# Patient Record
Sex: MALE | Race: WHITE | Employment: FULL TIME | URBAN - METROPOLITAN AREA
[De-identification: names, ages, dates, MRNs, and addresses within clinical notes are randomized per-mention and may not be internally consistent; named-entity substitution may affect disease eponyms.]

---

## 2023-10-26 PROBLEM — M25.362 INSTABILITY OF LEFT KNEE JOINT: Status: ACTIVE | Noted: 2023-10-26

## 2023-10-26 PROBLEM — M71.22 BAKER CYST, LEFT: Status: ACTIVE | Noted: 2023-10-26

## 2023-10-26 PROBLEM — M17.12 PRIMARY OSTEOARTHRITIS OF LEFT KNEE: Status: ACTIVE | Noted: 2023-10-26

## 2023-10-26 PROBLEM — M25.569 KNEE PAIN: Status: ACTIVE | Noted: 2023-10-26

## 2023-10-26 RX ORDER — HYALURONATE SODIUM 20 MG/2 ML
1 SYRINGE (ML) INTRAARTICULAR
COMMUNITY
Start: 2022-03-31 | End: 2024-04-22 | Stop reason: HOSPADM

## 2023-10-27 ENCOUNTER — APPOINTMENT (OUTPATIENT)
Dept: ORTHOPEDIC SURGERY | Facility: CLINIC | Age: 53
End: 2023-10-27
Payer: COMMERCIAL

## 2023-11-17 ENCOUNTER — APPOINTMENT (OUTPATIENT)
Dept: ORTHOPEDIC SURGERY | Facility: CLINIC | Age: 53
End: 2023-11-17

## 2024-01-24 ENCOUNTER — OFFICE VISIT (OUTPATIENT)
Dept: ORTHOPEDIC SURGERY | Facility: HOSPITAL | Age: 54
End: 2024-01-24
Payer: COMMERCIAL

## 2024-01-24 VITALS — WEIGHT: 195 LBS | HEIGHT: 72 IN | BODY MASS INDEX: 26.41 KG/M2

## 2024-01-24 DIAGNOSIS — M71.22 BAKER CYST, LEFT: ICD-10-CM

## 2024-01-24 DIAGNOSIS — M17.12 PRIMARY OSTEOARTHRITIS OF LEFT KNEE: Primary | ICD-10-CM

## 2024-01-24 PROCEDURE — 2500000004 HC RX 250 GENERAL PHARMACY W/ HCPCS (ALT 636 FOR OP/ED): Performed by: FAMILY MEDICINE

## 2024-01-24 PROCEDURE — 2500000005 HC RX 250 GENERAL PHARMACY W/O HCPCS: Performed by: FAMILY MEDICINE

## 2024-01-24 PROCEDURE — 99214 OFFICE O/P EST MOD 30 MIN: CPT | Performed by: FAMILY MEDICINE

## 2024-01-24 PROCEDURE — 1036F TOBACCO NON-USER: CPT | Performed by: FAMILY MEDICINE

## 2024-01-24 PROCEDURE — 20611 DRAIN/INJ JOINT/BURSA W/US: CPT | Performed by: FAMILY MEDICINE

## 2024-01-24 RX ORDER — LIDOCAINE HYDROCHLORIDE 10 MG/ML
4 INJECTION INFILTRATION; PERINEURAL
Status: COMPLETED | OUTPATIENT
Start: 2024-01-24 | End: 2024-01-24

## 2024-01-24 RX ORDER — METHYLPREDNISOLONE ACETATE 40 MG/ML
80 INJECTION, SUSPENSION INTRA-ARTICULAR; INTRALESIONAL; INTRAMUSCULAR; SOFT TISSUE
Status: COMPLETED | OUTPATIENT
Start: 2024-01-24 | End: 2024-01-24

## 2024-01-24 RX ADMIN — LIDOCAINE HYDROCHLORIDE 4 ML: 10 INJECTION, SOLUTION INFILTRATION; PERINEURAL at 11:33

## 2024-01-24 RX ADMIN — METHYLPREDNISOLONE ACETATE 80 MG: 40 INJECTION, SUSPENSION INTRA-ARTICULAR; INTRALESIONAL; INTRAMUSCULAR; INTRASYNOVIAL; SOFT TISSUE at 11:33

## 2024-01-24 ASSESSMENT — PAIN - FUNCTIONAL ASSESSMENT: PAIN_FUNCTIONAL_ASSESSMENT: 0-10

## 2024-01-24 ASSESSMENT — PAIN SCALES - GENERAL: PAINLEVEL_OUTOF10: 6

## 2024-01-24 ASSESSMENT — PAIN DESCRIPTION - DESCRIPTORS: DESCRIPTORS: SHARP

## 2024-01-24 NOTE — PROGRESS NOTES
FUV: Left Knee Patient ID: Amarjit Hernandez is a 53 y.o. male.    L Inj/Asp: L knee on 1/24/2024 11:33 AM  Indications: pain  Details: 21 G needle, ultrasound-guided superolateral approach  Medications: 80 mg methylPREDNISolone acetate 40 mg/mL; 4 mL lidocaine 10 mg/mL (1 %)  Outcome: tolerated well, no immediate complications  Procedure, treatment alternatives, risks and benefits explained, specific risks discussed. Consent was given by the patient. Immediately prior to procedure a time out was called to verify the correct patient, procedure, equipment, support staff and site/side marked as required. Patient was prepped and draped in the usual sterile fashion.       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     On 10/01/21, he presented for evaluation of chronic left knee pain upon referral by Dr. Friedman. He states he has had his current pain for 2 years. It is worse with long walking along the medial aspect and pain with stairs and flexion along the anterior. He denies inciting event or trauma. He states it was with insidious onset. It is most significant with workouts where he performs [employer] and jumping. He does not currently use an assistive device, brace however he does use a compression sleeve. He does not use topical or oral medication for his pain. He has history of arthroscopic procedure in 2019 for the left knee. This was for a arthroscopic debridement. He has received multiple cortisone injections in the past most recent being June 2021 which provided 3 months of 100% relief which is typical for him. Today is requesting cortisone injection into the left knee and discussion of other possible therapeutic options. He has no other complaints today. We agreed to proceed with a corticosteroid injection into the left knee. He tolerated  the procedure well without immediate complication. we discussed that in the future he would be a candidate for both hyaluronic acid injections and possibly with for PRP. We discussed both the hyaluronic acid and PRP extensively for consideration in the future. Additionally it was recommended that he proceed with acetaminophen as needed for his left knee pain. All of his questions were answered today. He will return to clinic as needed for continued injections in the future.     On 12/30/2021 he returns today for follow-up of his left knee pain. He indicates that since his left knee corticosteroid injection he has had 100% relief in his pain with intermittent sharp flares with some maneuvers otherwise no pain. Today he wants to discuss his persistent Baker's cyst which is located along the posterior aspect of his knee. Since his prior visit he has felt that it has grown in size and has become increasingly uncomfortable with deep flexion and he is notices stiffness due to the size of the cyst. He has no history of drainage and cyst in the past and no previous Baker's cyst. He has had no new traumas or falls. He would also like to consider repeat corticosteroid injection for his left knee as well for continued relief in his symptoms. We agreed to a diagnostic and hopefully therapeutic cortisone injection into the left knee as well as a therapeutic Baker's cyst aspiration. This was tolerated well. Activity modifications were reviewed. We discussed proceeding with insurance authorization for hyaluronic acid injections. After authorization he will follow-up in clinic for initiation of the HA series.      On 3/29/2022, he returns for 3-month follow-up of chronic left knee pain and a swollen Baker's cyst. He is requesting cyst aspiration today. He had great relief for 2 months and then it started to return over the past 1 month. He does not have much anterior knee pain. This is painful and limiting his daily and  recreational activities. He denies interval injury or trauma. We agreed to aspiration of the Baker's cyst in the popliteal fossa and then a cortisone injection into the knee joint. He tolerated this well. Activity modifications were reviewed. We again reviewed my recommendations of pursuing insurance authorization for hyaluronic acid injections. We will try that and then possibly PRP in the future if needed.      On 5/31/2022, he returns for 2-month follow-up of his chronic left knee DJD and symptomatic Baker's cyst. He has had increased swelling for several weeks now and is going to [place] on a family trip. He was hoping to get the cyst drained before he leaves. Were planning on doing PRP later in June. He denies interval injury or trauma.   We agreed to aspiration of the Baker's cyst in the popliteal fossa. He tolerated this well. Activity modifications were reviewed. We we will plan on PRP in the next 6 to 8 weeks. He will follow-up sooner if needed.     On 10/18/2022, he returns for a 4.5-month follow-up of chronic left knee DJD and symptomatic Baker's cyst. He last had a cortisone injection on 3/29/2022 and we were planning on PRP this summer but he was too busy with family and work activities and travel. The Baker's cyst has become very swollen, tight and painful. He is wanting aspiration. He would also like a cortisone injection to help with pain and to try to slow down the accumulation of a recurrent Baker's cyst. He is interested in pursuing PRP this fall now that he is not as active. He denies interval injury or trauma.  We agreed to aspiration of the Baker's cyst in the popliteal fossa and a cortisone injection in the superior lateral, anterior joint of the left knee. He tolerated this well. Activity modifications were reviewed. We we will plan on PRP in the next 6 to 8 weeks. He will follow-up sooner if needed.     On 1/24/2023, he returns for 3-month follow-up of chronic left knee DJD and a  symptomatic Baker's cyst. He wants to try PRP today. He understands this is an out-of-pocket expense and not covered by insurance. He reports persistent pain at the knee as he described before and the previous cortisone injection has worn off. He feels swelling behind the knee but not as bad as last visit. He would like to proceed with aspiration and PRP if possible. He denies interval injury or trauma. He has no contraindications to PRP. He has no other new acute complaints today.  We agreed to aspiration of the Baker's cyst in the popliteal fossa and start PRP treatment today. We were successful obtaining blood specimen out of the right antecubital fossa. PRP was injected into the left knee without any complications. We reviewed the post procedure protocol including nonweightbearing, crutch assisted ambulation for 2 days and then progressing off the crutches as tolerated. He will follow-up in 8 weeks or sooner for recheck.     On 6/20/2023, he returns for 5-month follow-up of chronic left knee pain and a symptomatic Baker's cyst status post PRP trial on 1/24/2023. He reports only 2 to 3 months of symptom relief with the PRP and then his pain returned. He first noticed nighttime pain and then this progressed to pain during the day while at rest. He continues to be active and this is limiting his ADLs and recreational activities. He denies interval injury or trauma. The swelling behind the knee returned in March and has worsened. It is now painful and symptomatic as before. He is hoping to get knee aspiration and cortisone injection which helped him in the past. He is currently not using an orthopedic knee brace but has used 1 over 5 years ago. It is worn out. He would consider a new one. He has no problems with the right knee.  We agreed to aspiration of the Baker's cyst in the popliteal fossa and repeat a cortisone injection into the left knee joint. He tolerated this well. We agreed that he did not get much  benefit from PRP. He may want to consider viscosupplementation in the near future. He can contact my  to set this up. We did review orthopedic bracing and due to his near bone-on-bone medial compartment DJD, he would likely benefit from an  knee brace. He has used one in the past and needs a new one. We agreed to try the OA Lety medial  brace and he was measured today. He will follow-up when his symptoms return.     On 9/12/2023, he returns for 3-month follow-up of chronic left knee pain and swelling and a Baker's cyst. He is requesting repeat aspiration of the Baker's cyst and cortisone injection. He has an upcoming trip and would like better mobility. He never got his orthopedic knee brace after he was measured at the last visit. He is interested in knee surgery including partial versus total replacement. He denies interval injury or trauma.  We agreed to aspiration of the Baker's cyst in the popliteal fossa and repeat a cortisone injection into the left knee joint. He tolerated this well. We agreed that he has maximized my attempts at conservative management with PRP, cortisone injections and other nonsurgical attempts. He is interested in orthopedic bracing and due to his near bone-on-bone medial compartment DJD, he would likely benefit from an  knee brace. He is interested in partial versus total knee replacement and we will set him up with Dr. Vaughn. He will follow-up when his symptoms return.     Today, 1/24/2024, he returns for 4-month follow-up of chronic left knee pain and swelling, especially at a Baker's cyst.  He would like knee aspiration and cortisone injection if possible before he undergoes knee replacement on 4/22/2024 with Dr. Vaughn.  He is having quite a bit of pain and stiffness from the Baker's cyst.  He denies interval injury or trauma.  He is still trying to be very active.    He has no other complaints.    Physical Examination:     The left knee has  positive laxity with valgus stress test at 0 degrees but no pain. The left knee is without obvious signs of acute bony deformity, swelling, erythema, ecchymosis. No anterior joint effusion. Large, minimally tender baker's cyst palpated in the popliteal fossa. The patella is without tenderness. Apprehension is negative with medial and lateral glide. Patella crepitus is positive. Patella grind is negative. The medial joint line is tender and without bony crepitus or step-off. The lateral joint line is nontender and without bony crepitus or step-off. Flexion & extension are full and symmetrical. There is no instability with stress testing. The opposite knee is nontender and stable. Gait is pain-free and tandem.     Imaging:  === 09/12/23 ===  XR KNEE COMPLETE 4 OR MORE VIEWS  - Impression -  Moderate tricompartmental osteoarthrosis, more pronounced in the  medial femorotibial compartment.      Procedure:  Procedure #1: After consent was obtained, the Posterior Left knee was prepped in a sterile fashion. Ultrasound guidance was used to help insure proper needle placement into the Baker's cyst, decrease patient discomfort, and decrease collateral damage. The cyst was aspirated of 71 mL of clear, straw-colored joint fluid without any complications. Ultrasound images were saved on an internal file for later reference. The patient tolerated the procedure well and the area was cleaned and bandaged.        Procedure #2: After consent was obtained, the LEFT knee was prepped in a sterile fashion. Ultrasound guidance was used to help insure proper needle placement into the knee joint, decrease patient discomfort, and decrease collateral damage. The joint was visualized and Depo-Medrol 80 mg with lidocaine 4 mL & ropivacaine 4 mL were injected without any complications. Ultrasound images were saved on an internal file for later reference. The patient tolerated the procedure well and the area was cleaned and bandaged.        Problem List Items Addressed This Visit             ICD-10-CM    Baker cyst, left M71.22    Relevant Orders    Point of Care Ultrasound (Completed)    Primary osteoarthritis of left knee - Primary M17.12       Assessment and Plan:     We reviewed the exam and x-ray findings and discussed the conservative and surgical treatment options. We agreed to aspiration of the Baker's cyst in the popliteal fossa and repeat a cortisone injection into the left knee joint. He tolerated this well.  He will keep his scheduled joint replacement surgery with Dr. Vaughn.  I am happy to see him back as needed.    **This note was dictated using Dragon speech recognition software and was not corrected for spelling or grammatical errors**.    Artemio García MD  Sports Medicine Specialist  [facility] [place]

## 2024-02-18 ENCOUNTER — TRANSCRIBE ORDERS (OUTPATIENT)
Dept: OPERATING ROOM | Facility: HOSPITAL | Age: 54
End: 2024-02-18
Payer: COMMERCIAL

## 2024-02-18 DIAGNOSIS — M17.12 UNILATERAL PRIMARY OSTEOARTHRITIS, LEFT KNEE: Primary | ICD-10-CM

## 2024-02-19 PROBLEM — M17.12 UNILATERAL PRIMARY OSTEOARTHRITIS, LEFT KNEE: Status: ACTIVE | Noted: 2024-02-18

## 2024-02-29 ENCOUNTER — OFFICE VISIT (OUTPATIENT)
Dept: ORTHOPEDIC SURGERY | Facility: CLINIC | Age: 54
End: 2024-02-29
Payer: COMMERCIAL

## 2024-02-29 VITALS — HEIGHT: 72 IN | BODY MASS INDEX: 26.41 KG/M2 | WEIGHT: 195 LBS

## 2024-02-29 DIAGNOSIS — M17.12 PRIMARY OSTEOARTHRITIS OF LEFT KNEE: Primary | ICD-10-CM

## 2024-02-29 PROCEDURE — 99214 OFFICE O/P EST MOD 30 MIN: CPT | Performed by: ORTHOPAEDIC SURGERY

## 2024-02-29 PROCEDURE — 1036F TOBACCO NON-USER: CPT | Performed by: ORTHOPAEDIC SURGERY

## 2024-02-29 ASSESSMENT — PAIN SCALES - GENERAL: PAINLEVEL_OUTOF10: 0 - NO PAIN

## 2024-02-29 ASSESSMENT — PAIN DESCRIPTION - DESCRIPTORS: DESCRIPTORS: DISCOMFORT

## 2024-02-29 ASSESSMENT — PAIN - FUNCTIONAL ASSESSMENT: PAIN_FUNCTIONAL_ASSESSMENT: 0-10

## 2024-02-29 NOTE — PROGRESS NOTES
Patient is a 53-year-old gentleman who presents today for discussion of upcoming left total knee arthroplasty.  He is failed a greater than 2-month trial of reasonable conservative treatment occluding anti-inflammatories, cortisone injections and bracing.  He performs home exercise program.  He has a remote history of arthroscopy approximately 8 years ago.  He rates his pain at times as 8 out of 10.  He would like to proceed with total knee arthroplasty which is indicated at this time.    Left knee:  AAOx3, NAD, walks with a moderate antalgic gait  Varus allignment  Range of motion lacks 10 degrees of full extension and flexes to 110 degrees  Stable to varus/valgus/anterior/posterior stress through out the range of motion  Slight laxity with varus stress  Diffuse medial  joint line tenderness to palpation  Moderate effusion  SILT in a antonietta/saph/per/tib distribution  5/5 knee extension/df/pf/ehl  ½ dorsalis pedis and posterior tibial pulse  no popliteal lymphadenopathy  no other overlying lesions  mood: euthymic  Respirations non labored    Plain films were reviewed by myself in clinic today.  They have advanced osteoarthritis of their knee with significant joint space narrowing, bone on bone changes, underlying sclerosis and tricompartmental osteophytic change.    Patient is now failed a greater than 3-month trial of reasonable conservative treatment and wishes proceed with surgery which is indicated at this time.  Discussed the risk benefits alternatives to surgery.  All of their questions were answered.    Pro[place]: left total knee  [place] [facility] [place]: M17.12  CPT: 93294  Stay: outpatient  Equipment: [facility]    I talked with the patient at length about risks, limitations, benefits and alternatives to total knee replacement today. I reviewed concerns about implant wear, loosening, breakage, infection and infection prophylaxis, DVT, PE, death and other medical and anesthetic complications of surgery.  We talked about the potential for persistent pain following surgery since there are many possible causes for knee and leg pain. The patient was advised that knee replacement will only relieve pain that is coming from the knee. We talked about limited range of motion following knee replacement and the importance of physical therapy and their motivation. We talked about improvements in pain management post-operatively and our accelerated rehab program. We talked about wound healing issues and neurovascular complications of surgery. I reviewed functional and activity restrictions in detail. We discussed the possible need for a homologous blood transfusion. We discussed the fact that many of our patients are able to go home in 1 day or less depending on their health, mobility, pre-op preparation, individual home situation and personal preference.  The patient has identified their personal goals of their joint replacement surgery and recovery and we have discussed them. These are documented in our [employer] patient engagement platform. In addition, we have discussed the advantages and disadvantages of various implant and fixation options, as well as various surgical approaches.  The basic concepts of the joint replacement procedure has been reviewed with the patient and the patient has been provided the opportunity to see an actual implant either in the office or in our pre-op education class.  All of the patients questions were answered. The patient can call my office to schedule surgery and the pre-op teaching class. I told the patient that they should contact their primary care physician to discuss fitness for surgery.    This note was created using voice recognition software and was not corrected for typographical or grammatical errors.

## 2024-03-14 NOTE — PROGRESS NOTES
Thank you for attending our Joint Replacement class today in preparation for your upcoming surgery.  Topics discussed include:    MyChart Enrollment  Communication with Care Team  My Chart is the best form of communication to reach all of your caregivers  You can send messages to specific care givers, or a care team  Continued Education  You will be enrolled in a Total Joint Replacement care plan to receive additional education before and after surgery  You can review a short recording of the class content  Access to Medical Records  You can access test results, office notes, appointments, etc.  You can connect to other healthcare systems who use [employer] ([facility], [facility], [facility], etc.)  [employer]  Program Information  Consent to Enroll    Background/Understanding of Joint Replacement Surgery  Potential for same day discharge  Any questions or concerns to be directed to the surgeon's office    How to Prepare for Surgery  Use of Nicotine Products/Smoking  Stop several weeks before surgery  Such products slow down the healing process and increase risk of post-op infection and complications  Clearance for Surgery  Medical Clearance by Specialists  Dental Clearance  Cracked/Broken/Loose teeth left untreated may postpone surgery  The importance of post-op antibiotics for dental visits per surgeon protocol  Preadmission Testing  **Potential for postponed surgery if appropriate clearance is not obtained  Medication Instruction  Follow instructions provided by the doctor who prescribes your medication (typically, but not limited to cardiologist)  Preadmission testing will provide additional instructions during your appointment on what to stop and what to take as you get closer to surgery  For clarification of these instructions, please call preadmission testing directly - [phone]  Tips for Preparing the home for discharge from the hospital  Care Partner  Requirement for surgery, the patient must have a plan to have  help at home  Potential for postponed surgery if plan for home support cannot be established  How the care partner can help after surgery  CHG Body Wash/Mouth Wash  Follow the instructions given at preadmission testing  Body wash is to be used on the body and hair for 5 washes  Mouthwash is to be used the night before and morning of surgery  **This is a system-wide protocol developed by infectious disease professionals, we will not alter our recommendations for those with sensitive skin or those who have special hair needs.  Please follow the instructions as they are written as this will provide the best infection prevention measures for surgery.  Should you have an allergy to one of the products, please discuss with your preadmission team**    What to Expect in the Hospital/At Home  Morning of Surgery NPO Guidelines  Nothing to eat after midnight  Water can be consumed up to 2 hours prior to arrival  Surgical and Post-Surgical Care Team  Surgical Team  Anesthesia Team  Nursing  Physical Therapy  Care Coordinating  Pharmacy  Hospital Arrival Instructions  Arrive at the time provided to you  Consider traffic patterns (rush-hour) based on arrival time  Have arrangements made for a ride home  If discharging same day, care partner should remain at the hospital  Recovering after Surgery  Recovery Room - Visitors are not brought back  Transition to hospital room - 2nd Floor, Visitors will be directed to your room  The presence of and strategies for controlling surgical pain and swelling  The importance of early mobility  Side effects after surgery  What to expect if staying overnight    Discharge Planning  The intended plan for discharge will be for patients to discharge home  All patients require a care partner (family, friend, neighbor, etc.) to stay with the patient for the first few nights after surgery  The inability to secure help at home will postpone surgery  Home Care Services set up per surgeon order  Physical  Therapy  Occupational Therapy  **If desired, private duty care can be arranged by the patient ahead of time**  Outpatient Physical Therapy per surgeon order    Recovering at Home  Wound Care  Follow wound care instructions found in your discharge paperwork  Bandage is water-resistant and you may shower with the bandage  Do not scrub directly over the bandage  Do not submerge in water until cleared (bathtub, hot tub, pool, etc.)    Post-Op Risk Prevention  Infection Prevention  Promptly seek treatment for any infections post-operatively  Routine dental visits must be postponed for 3 months after surgery  Your surgeon may require antibiotics prior to future dental visits  Any concerns for infection not related directly to the knee or the hip should be managed by your primary care provider  Blood Clots  Be sure to complete the course of blood thinning medication as prescribed by your surgeon  Movement every 1-2 hours during the day is encouraged to prevent blood clots  Monitor for signs of blood clots  Wear compression stockings as prescribed by your surgeon  Constipation  Constipation is common following surgery  Drink plenty of fluids  Take stool softener/laxative as prescribed by your surgeon  Move around frequently  Eat foods high in fiber  Fall Prevention  Prepare home ahead of time to clear space to move with walker  Remove throw rugs and electrical cords from walkways  Install railings near any stairways with more than 2 steps  Use night lights for increased visibility at night  Continue to use your assistive device until cleared by surgeon or physical therapy  Dislocation Prevention - Not all procedures will have dislocation precautions  Follow dislocation precautions provided by your surgeon  It is OK to resume sexual activity about 6 weeks following surgery  Be sure to follow any dislocation precautions assigned    Durable Medical Equipment  Cold Therapy  Breg Cold Therapy Machines  Ice/Gel Packs  Assistive  Devices  Folding Walker with Wheels (in the front only)  No Rollators  Crutches if approved by Physical Therapy and Surgeon after surgery  Hip Kits  Raised Toilet Seats  Additional Compression Stockings    Joint Preservation  Healthy Activities when Cleared  Walking  Swimming  Bike Riding  Activities to Avoid  Refrain from repetitive motions which have a high impact on the joint  Gradual Progression  Progress activity slowly, listen to your body  Common Findings - NORMAL after surgery  Clicking/Grinding  Numbness near incision    Physical Therapy  Prehabilitation exercises  START TODAY ON BOTH LEGS  Surgery Specific Precautions  Follow surgery specific precautions found in your discharge paperwork    Follow-Up Visit  All patients will see their surgeon for a follow up visit after surgery  The visit may range from 2-6 weeks after surgery and is surgeon specific      Please don't hesitate to reach out if you have any additional questions or concerns.    Sarita Thao MBA, BSN, RN-BC  MALISSA SerratoN, RN  Orthopedic Program Navigators  [facility]   [phone]

## 2024-03-20 ENCOUNTER — EDUCATION (OUTPATIENT)
Dept: ORTHOPEDIC SURGERY | Facility: HOSPITAL | Age: 54
End: 2024-03-20
Payer: COMMERCIAL

## 2024-03-20 ASSESSMENT — KOOS JR
STRAIGHTENING KNEE FULLY: MODERATE
KOOS JR SCORING: 68.28
HOW SEVERE IS YOUR KNEE STIFFNESS AFTER FIRST WAKING IN MORNING: MODERATE
RISING FROM SITTING: MILD
TWISING OR PIVOTING ON KNEE: MODERATE

## 2024-04-04 ENCOUNTER — TELEMEDICINE CLINICAL SUPPORT (OUTPATIENT)
Dept: PREADMISSION TESTING | Facility: HOSPITAL | Age: 54
End: 2024-04-04
Payer: COMMERCIAL

## 2024-04-04 VITALS — BODY MASS INDEX: 26.41 KG/M2 | WEIGHT: 195 LBS | HEIGHT: 72 IN

## 2024-04-10 ENCOUNTER — APPOINTMENT (OUTPATIENT)
Dept: PREADMISSION TESTING | Facility: HOSPITAL | Age: 54
End: 2024-04-10
Payer: COMMERCIAL

## 2024-04-12 ENCOUNTER — TELEPHONE (OUTPATIENT)
Dept: ORTHOPEDIC SURGERY | Facility: HOSPITAL | Age: 54
End: 2024-04-12
Payer: COMMERCIAL

## 2024-04-12 ASSESSMENT — KOOS JR
HOW SEVERE IS YOUR KNEE STIFFNESS AFTER FIRST WAKING IN MORNING: MODERATE
RISING FROM SITTING: MILD
TWISING OR PIVOTING ON KNEE: MODERATE
STRAIGHTENING KNEE FULLY: MODERATE
KOOS JR SCORING: 68.28

## 2024-04-12 NOTE — TELEPHONE ENCOUNTER
I have transcribed the survey answers you provided during joint class into the electronic record. Thank you for completing them. This will help us to track your progress from preop to postop.    Thank you,    Geraldine Romero MBA, BSN, RN-BC  Orthopedic   [facility]  [phone]

## 2024-04-15 ENCOUNTER — TELEPHONE (OUTPATIENT)
Dept: ORTHOPEDIC SURGERY | Facility: HOSPITAL | Age: 54
End: 2024-04-15
Payer: COMMERCIAL

## 2024-04-15 DIAGNOSIS — Z47.1 AFTERCARE FOLLOWING LEFT KNEE JOINT REPLACEMENT SURGERY: ICD-10-CM

## 2024-04-15 DIAGNOSIS — Z96.652 AFTERCARE FOLLOWING LEFT KNEE JOINT REPLACEMENT SURGERY: ICD-10-CM

## 2024-04-15 NOTE — TELEPHONE ENCOUNTER
Thank you for taking my call today.  All questions were answered at the time of the call, but please feel free to reach out to me via [employer] or phone, [phone], with any new questions or concerns.       We confirmed that you opted to enroll in our [employer] program so your discharge prescriptions will be available to take home at the time of discharge.  Please bring any prescription insurance coverage with you on the morning of surgery so that we can enter the information into our system.     We confirmed that your plan would be to Discharge Home same day ([facility]).    We confirmed that you have DME needed for recovery.     Use the provided body wash for 4 days before surgery and complete a 5th shower on the morning of surgery, this includes your body and hair.  Follow the directions as provided during preadmission testing.  The mouth wash will be used the night before and the morning of surgery.       As a reminder, if you do not hear from our team, please call [phone] between 2pm and 3pm the business day before your surgery to confirm your arrival time and details.        Please don't hesitate to reach out with additional questions or concerns.    Sarita Thao MBA, BSN, RN-BC  MALISSA SerratoN, RN  [facility]  [facility]  [phone]

## 2024-04-17 ENCOUNTER — LAB (OUTPATIENT)
Dept: LAB | Facility: LAB | Age: 54
End: 2024-04-17
Payer: COMMERCIAL

## 2024-04-17 ENCOUNTER — HOSPITAL ENCOUNTER (OUTPATIENT)
Dept: RADIOLOGY | Facility: HOSPITAL | Age: 54
Discharge: HOME | End: 2024-04-17
Payer: COMMERCIAL

## 2024-04-17 ENCOUNTER — PRE-ADMISSION TESTING (OUTPATIENT)
Dept: PREADMISSION TESTING | Facility: HOSPITAL | Age: 54
End: 2024-04-17
Payer: COMMERCIAL

## 2024-04-17 VITALS
DIASTOLIC BLOOD PRESSURE: 85 MMHG | BODY MASS INDEX: 27.67 KG/M2 | HEART RATE: 71 BPM | WEIGHT: 204.26 LBS | SYSTOLIC BLOOD PRESSURE: 122 MMHG | HEIGHT: 72 IN | OXYGEN SATURATION: 96 % | TEMPERATURE: 97.9 F | RESPIRATION RATE: 14 BRPM

## 2024-04-17 DIAGNOSIS — Z01.818 PREOPERATIVE TESTING: Primary | ICD-10-CM

## 2024-04-17 DIAGNOSIS — M17.12 UNILATERAL PRIMARY OSTEOARTHRITIS, LEFT KNEE: ICD-10-CM

## 2024-04-17 DIAGNOSIS — Z01.818 PREOPERATIVE TESTING: ICD-10-CM

## 2024-04-17 LAB
ANION GAP SERPL CALC-SCNC: 13 MMOL/L (ref 10–20)
BASOPHILS # BLD AUTO: 0.06 X10*3/UL (ref 0–0.1)
BASOPHILS NFR BLD AUTO: 0.7 %
BUN SERPL-MCNC: 19 MG/DL (ref 6–23)
CALCIUM SERPL-MCNC: 9.8 MG/DL (ref 8.6–10.3)
CHLORIDE SERPL-SCNC: 102 MMOL/L (ref 98–107)
CO2 SERPL-SCNC: 28 MMOL/L (ref 21–32)
CREAT SERPL-MCNC: 0.91 MG/DL (ref 0.5–1.3)
EGFRCR SERPLBLD CKD-EPI 2021: >90 ML/MIN/1.73M*2
EOSINOPHIL # BLD AUTO: 0.25 X10*3/UL (ref 0–0.7)
EOSINOPHIL NFR BLD AUTO: 2.9 %
ERYTHROCYTE [DISTWIDTH] IN BLOOD BY AUTOMATED COUNT: 12.6 % (ref 11.5–14.5)
EST. AVERAGE GLUCOSE BLD GHB EST-MCNC: 103 MG/DL
GLUCOSE SERPL-MCNC: 93 MG/DL (ref 74–99)
HBA1C MFR BLD: 5.2 %
HCT VFR BLD AUTO: 43.3 % (ref 41–52)
HGB BLD-MCNC: 14.5 G/DL (ref 13.5–17.5)
IMM GRANULOCYTES # BLD AUTO: 0.01 X10*3/UL (ref 0–0.7)
IMM GRANULOCYTES NFR BLD AUTO: 0.1 % (ref 0–0.9)
LYMPHOCYTES # BLD AUTO: 3.14 X10*3/UL (ref 1.2–4.8)
LYMPHOCYTES NFR BLD AUTO: 36.6 %
MCH RBC QN AUTO: 29.7 PG (ref 26–34)
MCHC RBC AUTO-ENTMCNC: 33.5 G/DL (ref 32–36)
MCV RBC AUTO: 89 FL (ref 80–100)
MONOCYTES # BLD AUTO: 0.6 X10*3/UL (ref 0.1–1)
MONOCYTES NFR BLD AUTO: 7 %
NEUTROPHILS # BLD AUTO: 4.52 X10*3/UL (ref 1.2–7.7)
NEUTROPHILS NFR BLD AUTO: 52.7 %
NRBC BLD-RTO: NORMAL /100{WBCS}
PLATELET # BLD AUTO: 298 X10*3/UL (ref 150–450)
POTASSIUM SERPL-SCNC: 3.8 MMOL/L (ref 3.5–5.3)
RBC # BLD AUTO: 4.88 X10*6/UL (ref 4.5–5.9)
SODIUM SERPL-SCNC: 139 MMOL/L (ref 136–145)
WBC # BLD AUTO: 8.6 X10*3/UL (ref 4.4–11.3)

## 2024-04-17 PROCEDURE — 93005 ELECTROCARDIOGRAM TRACING: CPT

## 2024-04-17 PROCEDURE — 87081 CULTURE SCREEN ONLY: CPT

## 2024-04-17 PROCEDURE — 77073 BONE LENGTH STUDIES: CPT | Performed by: RADIOLOGY

## 2024-04-17 PROCEDURE — 73562 X-RAY EXAM OF KNEE 3: CPT | Performed by: RADIOLOGY

## 2024-04-17 PROCEDURE — 83036 HEMOGLOBIN GLYCOSYLATED A1C: CPT

## 2024-04-17 PROCEDURE — 80048 BASIC METABOLIC PNL TOTAL CA: CPT

## 2024-04-17 PROCEDURE — 77073 BONE LENGTH STUDIES: CPT

## 2024-04-17 PROCEDURE — 99204 OFFICE O/P NEW MOD 45 MIN: CPT | Performed by: NURSE PRACTITIONER

## 2024-04-17 PROCEDURE — 85025 COMPLETE CBC W/AUTO DIFF WBC: CPT

## 2024-04-17 PROCEDURE — 73562 X-RAY EXAM OF KNEE 3: CPT | Mod: LT

## 2024-04-17 PROCEDURE — 36415 COLL VENOUS BLD VENIPUNCTURE: CPT

## 2024-04-17 RX ORDER — CHLORHEXIDINE GLUCONATE ORAL RINSE 1.2 MG/ML
15 SOLUTION DENTAL AS NEEDED
Qty: 30 ML | Refills: 0 | Status: SHIPPED | OUTPATIENT
Start: 2024-04-17 | End: 2024-04-22 | Stop reason: HOSPADM

## 2024-04-17 ASSESSMENT — ENCOUNTER SYMPTOMS
PSYCHIATRIC NEGATIVE: 1
GASTROINTESTINAL NEGATIVE: 1
RESPIRATORY NEGATIVE: 1
ENDOCRINE NEGATIVE: 1
CARDIOVASCULAR NEGATIVE: 1
HEMATOLOGIC/LYMPHATIC NEGATIVE: 1
CONSTITUTIONAL NEGATIVE: 1
EYES NEGATIVE: 1
ALLERGIC/IMMUNOLOGIC NEGATIVE: 1
NEUROLOGICAL NEGATIVE: 1

## 2024-04-17 ASSESSMENT — PAIN - FUNCTIONAL ASSESSMENT: PAIN_FUNCTIONAL_ASSESSMENT: 0-10

## 2024-04-17 ASSESSMENT — PAIN SCALES - GENERAL: PAINLEVEL_OUTOF10: 0 - NO PAIN

## 2024-04-17 NOTE — PREPROCEDURE INSTRUCTIONS
Medication List            Accurate as of April 17, 2024  3:57 PM. Always use your most recent med list.                chlorhexidine 0.12 % solution  Commonly known as: Peridex  Use 15 mL in the mouth or throat if needed (pre operative 15ml swish and spit the night befor and morning of surgery) for up to 2 doses.  Notes to patient: As instructed     Euflexxa 10 mg/mL(mw 2.4 -3.6 million) injection  Generic drug: sodium hyaluronate  Notes to patient: Per prescriber              NPO Instructions:    Do not eat any food after midnight the night before your surgery/procedure.  You may have clear liquids until TWO hours before surgery/procedure. This includes water, black tea/coffee, (no milk or cream) apple juice and electrolyte drinks (Gatorade).  You may chew gum up to TWO hours before your surgery/procedure.    Additional Instructions:     Seven/Six Days before Surgery:  Review your medication instructions, stop indicated medications  Five Days before Surgery:  Review your medication instructions, stop indicated medications  Begin using your Hibiclens  Three Days before Surgery:  Review your medication instructions, stop indicated medications  The Day before Surgery:  Review your medication instructions, stop indicated medications  You will be contacted regarding the time of your arrival to facility and surgery time  Do not eat any food after Midnight  Day of Surgery:  Review your medication instructions, take indicated medications  You may have clear liquids until TWO hours before surgery/procedure.  This includes water, black tea/coffee, (no milk or cream) apple juice and electrolyte drinks (Gatorade)  You may chew gum up to TWO hours before your surgery/procedure  Wear  comfortable loose fitting clothing  Do not use moisturizers, creams, lotions or perfume  All jewelry and valuables should be left at home  CONTACT SURGEON'S OFFICE IF YOU DEVELOP:  * Fever = 100.4 F   * New respiratory symptoms (e.g. cough,  shortness of breath, respiratory distress, sore throat)  * Recent loss of taste or smell  *Flu like symptoms such as headache, fatigue or gastrointestinal symptoms  * You develop any open sores, shingles, burning or painful urination   AND/OR:  * You no longer wish to have the surgery.  * Any other personal circumstances change that may lead to the need to cancel or defer this surgery.  *You were admitted to any hospital within one week of your planned procedure.    SMOKING:  *Quitting smoking can make a huge difference to your health and recovery from surgery.    *If you need help with quitting, call 0-708-QUIT-NOW.    THE DAY BEFORE SURGERY:  *Do not eat any food after midnight the night before your surgery.   *You may have up to TEN OUNCES of clear liquids until TWO hours before your instructed ARRIVAL TIME to hospital. This includes water, black tea/coffee, (no milk or cream) apple juice, clear broth and electrolyte drinks (Gatorade). Please avoid clear liquids that are red in color.   *You may chew gum/mints up to TWO hours before your surgery/procedure.    SURGICAL TIME:  *You will be contacted between 2 p.m. and 3 p.m. the business day before your surgery with your arrival time.  *If you haven't received a call by 3pm, call [phone]  *Scheduled surgery times may change and you will be notified if this occurs-check your personal voicemail for any updates.    ON THE MORNING OF SURGERY:  *Wear comfortable, loose fitting clothing.   *Do not use moisturizers, creams, lotions or perfume.  *All jewelry and valuables should be left at home.  *Prosthetic devices such as contact lenses, hearing aids, dentures, eyelash extensions, hairpins and body piercing must be removed before surgery.    BRING WITH YOU:  *Photo ID and insurance card  *Current list of medications and allergies  *Pacemaker/Defibrillator/Heart stent cards  *CPAP machine and mask  *Slings/splints/crutches  *Copy of your complete Advanced  Directive/DHPOA-if applicable  *Neurostimulator implant remote    PARKING AND ARRIVAL:  *Check in at the Main Entrance desk and let them know you are here for surgery.    IF YOU ARE HAVING OUTPATIENT/SAME DAY SURGERY:  *A responsible adult MUST accompany you at the time of discharge and stay with you for 24 hours after your surgery.  *You may NOT drive yourself home after surgery.  *You may use a taxi or ride sharing service ([place], Uber) to return home ONLY if you are accompanied by a friend or family member.  *Instructions for resuming your medications will be provided by your surgeon.    Thank you for coming to Pre Admission testing.     If I have prescribe medication please don't forget to  at your pharmacy.     Any questions about today's visit call [phone] and leave a message in the general mailbox.    Patient instructed to ambulate as soon as possible postoperatively to decrease thromboembolic risk.    Thank you for visiting the [facility].  If you have any changes to your health condition, please call the surgeons office to alert them and give them details of your symptoms.    SAMIRA Stanley  Department of Anesthesiology and Perioperative Medicine      Preoperative Fasting Guidelines    Why must I stop eating and drinking near surgery time?  With sedation, food or liquid in your stomach can enter your lungs causing serious complications  Increases nausea and vomiting    When do I need to stop eating and drinking before my surgery?  Do not eat any food after midnight the night before your surgery/procedure.  You may have up to TEN ounces of clear liquid until TWO hours before your instructed arrival time to the hospital.  This includes water, black tea/coffee, (no milk or cream) apple juice, and electrolyte drinks (Gatorade)  You may chew gum until TWO hours before your surgery/procedure      Additional Instructions:     The Day before Surgery:  -Review your medication  instructions, stop indicated medications  -You will be contacted in the evening regarding the time of your arrival to facility and surgery time    Day of Surgery:  -Review your medication instructions, take indicated medications  -Wear comfortable loose fitting clothing  -Do not use moisturizers, creams, lotions or perfume  -All jewelry and valuables should be left at home              Preoperative Brain Exercises    What are brain exercises?  A brain exercise is any activity that engages your thinking (cognitive) skills.    What types of activities are considered brain exercises?  Jigsaw puzzles, crossword puzzles, word jumble, memory games, word search, and many more.  Many can be found free online or on your phone via a mobile jeison.    Why should I do brain exercises before my surgery?  More recent research has shown brain exercise before surgery can lower the risk of postoperative delirium (confusion) which can be especially important for older adults.  Patients who did brain exercises for 5 to 10 hours the days before surgery, cut their risk of postoperative delirium in half up to 1 week after surgery.                  [facility]    Preoperative Deep Breathing Exercises    Why it is important to do deep breathing exercises before my surgery?  Deep breathing exercises strengthen your breathing muscles.  This helps you to recover after your surgery and decreases the chance of breathing complications.      How are the deep breathing exercises done?  Sit straight with your back supported.  Breathe in deeply and slowly through your nose. Your lower rib cage should expand and your abdomen may move forward.  Hold that breath for 3 to 5 seconds.  Breathe out through pursed lips, slowly and completely.  Rest and repeat 10 times every hour while awake.  Rest longer if you become dizzy or lightheaded.                [facility]    Preoperative Deep Breathing  Exercises    Why it is important to do deep breathing exercises before my surgery?  Deep breathing exercises strengthen your breathing muscles.  This helps you to recover after your surgery and decreases the chance of breathing complications.      How are the deep breathing exercises done?  Sit straight with your back supported.  Breathe in deeply and slowly through your nose. Your lower rib cage should expand and your abdomen may move forward.  Hold that breath for 3 to 5 seconds.  Breathe out through pursed lips, slowly and completely.  Rest and repeat 10 times every hour while awake.  Rest longer if you become dizzy or lightheaded.      Patient Information: Incentive Spirometer  What is an incentive spirometer?  An incentive spirometer is a device used before and after surgery to “exercise” your lungs.  It helps you to take deeper breaths to expand your lungs.  Below is an example of a basic incentive spirometer.  The device you receive may differ slightly but they all function the same.    Why do I need to use an incentive spirometer?  Using your incentive spirometer prepares your lungs for surgery and helps prevent lung problems after surgery.  How do I use my incentive spirometer?  When you're using your incentive spirometer, make sure to breathe through your mouth. If you breathe through your nose, the incentive spirometer won't work properly. You can hold your nose if you have trouble.  If you feel dizzy at any time, stop and rest. Try again at a later time.  Follow the steps below:  Set up your incentive spirometer, expand the flexible tubing and connect to the outlet.  Sit upright in a chair or bed. Hold the incentive spirometer at eye level.   Put the mouthpiece in your mouth and close your lips tightly around it. Slowly breathe out (exhale) completely.  Breathe in (inhale) slowly through your mouth as deeply as you can. As you take a breath, you will see the piston rise inside the large column. While the  piston rises, the indicator should move upwards. It should stay in between the 2 arrows (see Figure).  Try to get the piston as high as you can, while keeping the indicator between the arrows.   If the indicator doesn't stay between the arrows, you're breathing either too fast or too slow.  When you get it as high as you can, hold your breath for 10 seconds, or as long as possible. While you're holding your breath, the piston will slowly fall to the base of the spirometer.  Once the piston reaches the bottom of the spirometer, breathe out slowly through your mouth. Rest for a few seconds.  Repeat 10 times. Try to get the piston to the same level with each breath.  Repeat every hour while awake  You can carefully clean the outside of the mouthpiece with an alcohol wipe or soap and water.      Patient and Family Education             Ways You Can Help Prevent Blood Clots             This handout explains some simple things you can do to help prevent blood clots.      Blood clots are blockages that can form in the body's veins. When a blood clot forms in your deep veins, it may be called a deep vein thrombosis, or DVT for short. Blood clots can happen in any part of the body where blood flows, but they are most common in the arms and legs. If a piece of a blood clot breaks free and travels to the lungs, it is called a pulmonary embolus (PE). A PE can be a very serious problem.         Being in the hospital or having surgery can raise your chances of getting a blood clot because you may not be well enough to move around as much as you normally do.         Ways you can help prevent blood clots in the hospital         Wearing SCDs. SCDs stands for Sequential Compression Devices.   SCDs are special sleeves that wrap around your legs  They attach to a pump that fills them with air to gently squeeze your legs every few minutes.   This helps return the blood in your legs to your heart.   SCDs should only be taken off when  walking or bathing.   SCDs may not be comfortable, but they can help save your life.               Wearing compression stockings - if your doctor orders them. These special snug fitting stockings gently squeeze your legs to help blood flow.       Walking. Walking helps move the blood in your legs.   If your doctor says it is ok, try walking the halls at least   5 times a day. Ask us to help you get up, so you don't fall.      Taking any blood thinning medicines your doctor orders.        Page 1 of 2     [facility]; 3/23   Ways you can help prevent blood clots at home       Wearing compression stockings - if your doctor orders them. ? Walking - to help move the blood in your legs.       Taking any blood thinning medicines your doctor orders.      Signs of a blood clot or PE      Tell your doctor or nurse know right away if you have of the problems listed below.    If you are at home, seek medical care right away. Call 911 for chest pain or problems breathing.               Signs of a blood clot (DVT) - such as pain,  swelling, redness or warmth in your arm or leg      Signs of a pulmonary embolism (PE) - such as chest     pain or feeling short of breath

## 2024-04-17 NOTE — H&P (VIEW-ONLY)
CPM/PAT Evaluation     Amarjit Hernandez is a 53 y.o. male   Chief Complaint: I am having my knee replaced    HPI:  Patient is a 53 y.o alert and oriented male coming in for PAT for a scheduled Knee Replacement Total Cement on 4/22/2024 w/ Dr. Vaughn.    The patient reports he has 5-6 achy knee pain with movement and sitting too long.  He states rest makes it better.  His knee does swell and does not give out.  He states rest makes it better.  He has had injections in the past and no physical therapy  Patient denies chest pain, SOB, GUAJARDO and NVDC.    Patient also denies Hx: DVT/PE.    Current medications were reviewed and a presurgical mediation schedule was provided.    He has no questions at this time.   Past Medical History:   Diagnosis Date    Arthritis       Past Surgical History:   Procedure Laterality Date    COLONOSCOPY      FOOT SURGERY      OTHER SURGICAL HISTORY  06/10/2021    Knee arthroscopy        No Known Allergies     Current Outpatient Medications on File Prior to Visit   Medication Sig Dispense Refill    sodium hyaluronate (Euflexxa) 10 mg/mL(mw 2.4 -3.6 million) injection Inject 1 Dose into the joint 1 (one) time per week. ONE INJECTION PER WEEK FOR THREE WEEKS LEFT KNEE       No current facility-administered medications on file prior to visit.       Vitals:    04/17/24 1539   BP: 122/85   Pulse: 71   Resp: 14   Temp: 36.6 °C (97.9 °F)   SpO2: 96%       Review of Systems   Constitutional: Negative.    HENT: Negative.     Eyes: Negative.    Respiratory: Negative.     Cardiovascular: Negative.    Gastrointestinal: Negative.    Endocrine: Negative.    Genitourinary: Negative.    Musculoskeletal:         See hpi for details   Skin: Negative.    Allergic/Immunologic: Negative.    Neurological: Negative.    Hematological: Negative.    Psychiatric/Behavioral: Negative.        Physical Exam  Vitals and nursing note reviewed.   Constitutional:       Appearance: Normal appearance.   HENT:      Head:  Normocephalic and atraumatic.      Mouth/Throat:      Mouth: Mucous membranes are moist.      Pharynx: Oropharynx is clear.   Eyes:      Pupils: Pupils are equal, round, and reactive to light.   Cardiovascular:      Rate and Rhythm: Normal rate and regular rhythm.      Heart sounds: Normal heart sounds.   Pulmonary:      Effort: Pulmonary effort is normal.      Breath sounds: Normal breath sounds.   Abdominal:      General: Abdomen is flat. Bowel sounds are normal.      Palpations: Abdomen is soft.   Musculoskeletal:         General: Swelling present. Normal range of motion.      Cervical back: Normal range of motion and neck supple.      Comments: Left knee swelling   Skin:     General: Skin is warm and dry.   Neurological:      General: No focal deficit present.      Mental Status: He is alert and oriented to person, place, and time.   Psychiatric:         Mood and Affect: Mood normal.         Behavior: Behavior normal.         Thought Content: Thought content normal.         Judgment: Judgment normal.        PAT AIRWAY:   Airway:     Mallampati::  II    TM distance::  >3 FB    Neck ROM::  Full    Assessment and Plan:     Neuro:   No neurologic diagnoses.  Preoperative brain exercise educational handout provided to patient.    HEENT/Airway:  Normal exam    Cardiovascular:  No additional preoperative testing is currently indicated.    ASA II  EKG NSR rate of 69   METS are  RCRI  0 which is 3.9% % 30 day risk of MACE (risk for cardiac death, nonfatal myocardial infarction, and nonfactal cardiac arrest  SAHIL score which indicates a   0.1% risk of intraoperative or 30-day postoperative MACE      Pulmonary:  Normal exam,  Breath sounds clear through out    Preoperative deep breathing educational handout provided to patient.    ARISCAT:    3  points which is a low (1.6%) risk of in-hospital post-op pulmonary complications   PRODIGY:    points which is a low risk of post op opioid induced respiratory depression  episodes  STOP BANG:   3  points which is a intermediate risk for moderate to severe YUE    Renal: No diagnosis or significant findings on chart review or clinical presentation and evaluation,  Pt at Low risk for perioperative KHUSHBU based on Dynamic Predictive Scoring Tool for Perioperative KHUSHBU  BMP ordered    Endocrine:  No diagnosis or significant findings on chart review or clinical presentation and evaluation.     Hematologic:  No diagnosis or significant findings on chart review or clinical presentation and evaluation.  Preoperative DVT educational handout provided to patient.    Caprini Score:    points which is a low risk of perioperative VTE    Gastrointestinal:   No diagnosis or significant findings on chart review or clinical presentation and evaluation.    Infectious disease:  negative or type diagnoses  Prescription provided for CHG body wash and dental rinse. CHG use instructions reviewed and provided to patient.  Staph screen collected     Musculoskeletal:  No diagnosis or significant findings on chart review or clinical presentation and evaluation.  Unilateral Primary Osteoarthritis Left Knee  Knee Replacement Total Cement Unilateral     Other:    JHFRAT-  0 low risk for falls risk    Anesthesia:  No anesthesia complications    Has dental dental   Does not smoke  Drinks 2 drinks daily  No drug use  No personal/family issues with Anesthesia  No nickel, shell fish, or iodine allergies    Discussed with patient medication instructions, NPO guidelines, and any questions or concerns. Patient does not need further workup prior to preceding with elective surgery based on based on risk assessment.     Chlorhexidine .12% Dental Rinse e-prescribed per  infection prevention protocol. Patient educated.   Patient advised to call [facility] if mouthwash not received.  Reminded patient to get ordered x rays after PAT today    Face to Face patient contact time 30 minutes    RODO Hdez 4/17/2024 11:45  AM    Labs ordered  cbc, basic, A1c, and MSSA/MRSA culture  No results found for this or any previous visit (from the past 24 hour(s)).

## 2024-04-17 NOTE — CPM/PAT H&P
CPM/PAT Evaluation     Amarjit Hernandez is a 53 y.o. male   Chief Complaint: I am having my knee replaced    HPI:  Patient is a 53 y.o alert and oriented male coming in for PAT for a scheduled Knee Replacement Total Cement on 4/22/2024 w/ Dr. Vaughn.    The patient reports he has 5-6 achy knee pain with movement and sitting too long.  He states rest makes it better.  His knee does swell and does not give out.  He states rest makes it better.  He has had injections in the past and no physical therapy  Patient denies chest pain, SOB, GUAJARDO and NVDC.    Patient also denies Hx: DVT/PE.    Current medications were reviewed and a presurgical mediation schedule was provided.    He has no questions at this time.   Past Medical History:   Diagnosis Date    Arthritis       Past Surgical History:   Procedure Laterality Date    COLONOSCOPY      FOOT SURGERY      OTHER SURGICAL HISTORY  06/10/2021    Knee arthroscopy        No Known Allergies     Current Outpatient Medications on File Prior to Visit   Medication Sig Dispense Refill    sodium hyaluronate (Euflexxa) 10 mg/mL(mw 2.4 -3.6 million) injection Inject 1 Dose into the joint 1 (one) time per week. ONE INJECTION PER WEEK FOR THREE WEEKS LEFT KNEE       No current facility-administered medications on file prior to visit.       Vitals:    04/17/24 1539   BP: 122/85   Pulse: 71   Resp: 14   Temp: 36.6 °C (97.9 °F)   SpO2: 96%       Review of Systems   Constitutional: Negative.    HENT: Negative.     Eyes: Negative.    Respiratory: Negative.     Cardiovascular: Negative.    Gastrointestinal: Negative.    Endocrine: Negative.    Genitourinary: Negative.    Musculoskeletal:         See hpi for details   Skin: Negative.    Allergic/Immunologic: Negative.    Neurological: Negative.    Hematological: Negative.    Psychiatric/Behavioral: Negative.        Physical Exam  Vitals and nursing note reviewed.   Constitutional:       Appearance: Normal appearance.   HENT:      Head:  Normocephalic and atraumatic.      Mouth/Throat:      Mouth: Mucous membranes are moist.      Pharynx: Oropharynx is clear.   Eyes:      Pupils: Pupils are equal, round, and reactive to light.   Cardiovascular:      Rate and Rhythm: Normal rate and regular rhythm.      Heart sounds: Normal heart sounds.   Pulmonary:      Effort: Pulmonary effort is normal.      Breath sounds: Normal breath sounds.   Abdominal:      General: Abdomen is flat. Bowel sounds are normal.      Palpations: Abdomen is soft.   Musculoskeletal:         General: Swelling present. Normal range of motion.      Cervical back: Normal range of motion and neck supple.      Comments: Left knee swelling   Skin:     General: Skin is warm and dry.   Neurological:      General: No focal deficit present.      Mental Status: He is alert and oriented to person, place, and time.   Psychiatric:         Mood and Affect: Mood normal.         Behavior: Behavior normal.         Thought Content: Thought content normal.         Judgment: Judgment normal.        PAT AIRWAY:   Airway:     Mallampati::  II    TM distance::  >3 FB    Neck ROM::  Full    Assessment and Plan:     Neuro:   No neurologic diagnoses.  Preoperative brain exercise educational handout provided to patient.    HEENT/Airway:  Normal exam    Cardiovascular:  No additional preoperative testing is currently indicated.    ASA II  EKG NSR rate of 69   METS are  RCRI  0 which is 3.9% % 30 day risk of MACE (risk for cardiac death, nonfatal myocardial infarction, and nonfactal cardiac arrest  SAHIL score which indicates a   0.1% risk of intraoperative or 30-day postoperative MACE      Pulmonary:  Normal exam,  Breath sounds clear through out    Preoperative deep breathing educational handout provided to patient.    ARISCAT:    3  points which is a low (1.6%) risk of in-hospital post-op pulmonary complications   PRODIGY:    points which is a low risk of post op opioid induced respiratory depression  episodes  STOP BANG:   3  points which is a intermediate risk for moderate to severe YUE    Renal: No diagnosis or significant findings on chart review or clinical presentation and evaluation,  Pt at Low risk for perioperative KHUSHBU based on Dynamic Predictive Scoring Tool for Perioperative KHUSHBU  BMP ordered    Endocrine:  No diagnosis or significant findings on chart review or clinical presentation and evaluation.     Hematologic:  No diagnosis or significant findings on chart review or clinical presentation and evaluation.  Preoperative DVT educational handout provided to patient.    Caprini Score:    points which is a low risk of perioperative VTE    Gastrointestinal:   No diagnosis or significant findings on chart review or clinical presentation and evaluation.    Infectious disease:  negative or type diagnoses  Prescription provided for CHG body wash and dental rinse. CHG use instructions reviewed and provided to patient.  Staph screen collected     Musculoskeletal:  No diagnosis or significant findings on chart review or clinical presentation and evaluation.  Unilateral Primary Osteoarthritis Left Knee  Knee Replacement Total Cement Unilateral     Other:    JHFRAT-  0 low risk for falls risk    Anesthesia:  No anesthesia complications    Has dental dental   Does not smoke  Drinks 2 drinks daily  No drug use  No personal/family issues with Anesthesia  No nickel, shell fish, or iodine allergies    Discussed with patient medication instructions, NPO guidelines, and any questions or concerns. Patient does not need further workup prior to preceding with elective surgery based on based on risk assessment.     Chlorhexidine .12% Dental Rinse e-prescribed per  infection prevention protocol. Patient educated.   Patient advised to call [facility] if mouthwash not received.  Reminded patient to get ordered x rays after PAT today    Face to Face patient contact time 30 minutes    RODO Hdez 4/17/2024 11:45  AM    Labs ordered  cbc, basic, A1c, and MSSA/MRSA culture  No results found for this or any previous visit (from the past 24 hour(s)).

## 2024-04-19 LAB — STAPHYLOCOCCUS SPEC CULT: NORMAL

## 2024-04-22 ENCOUNTER — PHARMACY VISIT (OUTPATIENT)
Dept: PHARMACY | Facility: CLINIC | Age: 54
End: 2024-04-22
Payer: COMMERCIAL

## 2024-04-22 ENCOUNTER — DOCUMENTATION (OUTPATIENT)
Dept: HOME HEALTH SERVICES | Facility: HOME HEALTH | Age: 54
End: 2024-04-22

## 2024-04-22 ENCOUNTER — HOSPITAL ENCOUNTER (OUTPATIENT)
Facility: HOSPITAL | Age: 54
Discharge: HOME | End: 2024-04-22
Attending: ORTHOPAEDIC SURGERY | Admitting: ORTHOPAEDIC SURGERY
Payer: COMMERCIAL

## 2024-04-22 ENCOUNTER — HOME HEALTH ADMISSION (OUTPATIENT)
Dept: HOME HEALTH SERVICES | Facility: HOME HEALTH | Age: 54
End: 2024-04-22
Payer: COMMERCIAL

## 2024-04-22 ENCOUNTER — ANESTHESIA EVENT (OUTPATIENT)
Dept: OPERATING ROOM | Facility: HOSPITAL | Age: 54
End: 2024-04-22
Payer: COMMERCIAL

## 2024-04-22 ENCOUNTER — ANESTHESIA (OUTPATIENT)
Dept: OPERATING ROOM | Facility: HOSPITAL | Age: 54
End: 2024-04-22
Payer: COMMERCIAL

## 2024-04-22 ENCOUNTER — HOME CARE VISIT (OUTPATIENT)
Dept: HOME HEALTH SERVICES | Facility: HOME HEALTH | Age: 54
End: 2024-04-22

## 2024-04-22 VITALS
RESPIRATION RATE: 20 BRPM | TEMPERATURE: 97.5 F | BODY MASS INDEX: 27.41 KG/M2 | DIASTOLIC BLOOD PRESSURE: 71 MMHG | OXYGEN SATURATION: 96 % | SYSTOLIC BLOOD PRESSURE: 115 MMHG | HEIGHT: 72 IN | WEIGHT: 202.38 LBS | HEART RATE: 71 BPM

## 2024-04-22 DIAGNOSIS — M17.12 UNILATERAL PRIMARY OSTEOARTHRITIS, LEFT KNEE: Primary | ICD-10-CM

## 2024-04-22 PROCEDURE — 7100000001 HC RECOVERY ROOM TIME - INITIAL BASE CHARGE: Performed by: ORTHOPAEDIC SURGERY

## 2024-04-22 PROCEDURE — G0378 HOSPITAL OBSERVATION PER HR: HCPCS

## 2024-04-22 PROCEDURE — 7100000002 HC RECOVERY ROOM TIME - EACH INCREMENTAL 1 MINUTE: Performed by: ORTHOPAEDIC SURGERY

## 2024-04-22 PROCEDURE — A27447 PR TOTAL KNEE ARTHROPLASTY: Performed by: ANESTHESIOLOGIST ASSISTANT

## 2024-04-22 PROCEDURE — 2720000007 HC OR 272 NO HCPCS: Performed by: ORTHOPAEDIC SURGERY

## 2024-04-22 PROCEDURE — 3600000010 HC OR TIME - EACH INCREMENTAL 1 MINUTE - PROCEDURE LEVEL FIVE: Performed by: ORTHOPAEDIC SURGERY

## 2024-04-22 PROCEDURE — 7100000009 HC PHASE TWO TIME - INITIAL BASE CHARGE: Performed by: ORTHOPAEDIC SURGERY

## 2024-04-22 PROCEDURE — 3600000005 HC OR TIME - INITIAL BASE CHARGE - PROCEDURE LEVEL FIVE: Performed by: ORTHOPAEDIC SURGERY

## 2024-04-22 PROCEDURE — RXMED WILLOW AMBULATORY MEDICATION CHARGE

## 2024-04-22 PROCEDURE — 2500000004 HC RX 250 GENERAL PHARMACY W/ HCPCS (ALT 636 FOR OP/ED): Performed by: STUDENT IN AN ORGANIZED HEALTH CARE EDUCATION/TRAINING PROGRAM

## 2024-04-22 PROCEDURE — 27447 TOTAL KNEE ARTHROPLASTY: CPT | Performed by: PHYSICIAN ASSISTANT

## 2024-04-22 PROCEDURE — 2500000005 HC RX 250 GENERAL PHARMACY W/O HCPCS: Performed by: ORTHOPAEDIC SURGERY

## 2024-04-22 PROCEDURE — A27447 PR TOTAL KNEE ARTHROPLASTY: Performed by: STUDENT IN AN ORGANIZED HEALTH CARE EDUCATION/TRAINING PROGRAM

## 2024-04-22 PROCEDURE — 2780000003 HC OR 278 NO HCPCS: Performed by: ORTHOPAEDIC SURGERY

## 2024-04-22 PROCEDURE — 27447 TOTAL KNEE ARTHROPLASTY: CPT | Performed by: ORTHOPAEDIC SURGERY

## 2024-04-22 PROCEDURE — 97530 THERAPEUTIC ACTIVITIES: CPT | Mod: GP

## 2024-04-22 PROCEDURE — 97110 THERAPEUTIC EXERCISES: CPT | Mod: GP

## 2024-04-22 PROCEDURE — 2500000005 HC RX 250 GENERAL PHARMACY W/O HCPCS: Performed by: PHYSICIAN ASSISTANT

## 2024-04-22 PROCEDURE — 94760 N-INVAS EAR/PLS OXIMETRY 1: CPT | Mod: 59

## 2024-04-22 PROCEDURE — 3700000002 HC GENERAL ANESTHESIA TIME - EACH INCREMENTAL 1 MINUTE: Performed by: ORTHOPAEDIC SURGERY

## 2024-04-22 PROCEDURE — A4649 SURGICAL SUPPLIES: HCPCS | Performed by: ORTHOPAEDIC SURGERY

## 2024-04-22 PROCEDURE — 64447 NJX AA&/STRD FEMORAL NRV IMG: CPT | Performed by: STUDENT IN AN ORGANIZED HEALTH CARE EDUCATION/TRAINING PROGRAM

## 2024-04-22 PROCEDURE — 3700000001 HC GENERAL ANESTHESIA TIME - INITIAL BASE CHARGE: Performed by: ORTHOPAEDIC SURGERY

## 2024-04-22 PROCEDURE — C1776 JOINT DEVICE (IMPLANTABLE): HCPCS | Performed by: ORTHOPAEDIC SURGERY

## 2024-04-22 PROCEDURE — 7100000010 HC PHASE TWO TIME - EACH INCREMENTAL 1 MINUTE: Performed by: ORTHOPAEDIC SURGERY

## 2024-04-22 PROCEDURE — 2500000001 HC RX 250 WO HCPCS SELF ADMINISTERED DRUGS (ALT 637 FOR MEDICARE OP): Performed by: PHYSICIAN ASSISTANT

## 2024-04-22 PROCEDURE — 2500000004 HC RX 250 GENERAL PHARMACY W/ HCPCS (ALT 636 FOR OP/ED): Mod: JZ | Performed by: PHYSICIAN ASSISTANT

## 2024-04-22 PROCEDURE — 2500000004 HC RX 250 GENERAL PHARMACY W/ HCPCS (ALT 636 FOR OP/ED): Performed by: PHYSICIAN ASSISTANT

## 2024-04-22 PROCEDURE — 2500000004 HC RX 250 GENERAL PHARMACY W/ HCPCS (ALT 636 FOR OP/ED): Performed by: ANESTHESIOLOGIST ASSISTANT

## 2024-04-22 PROCEDURE — 7100000011 HC EXTENDED STAY RECOVERY HOURLY - NURSING UNIT

## 2024-04-22 PROCEDURE — 97161 PT EVAL LOW COMPLEX 20 MIN: CPT | Mod: GP

## 2024-04-22 PROCEDURE — C1713 ANCHOR/SCREW BN/BN,TIS/BN: HCPCS | Performed by: ORTHOPAEDIC SURGERY

## 2024-04-22 PROCEDURE — 2500000005 HC RX 250 GENERAL PHARMACY W/O HCPCS: Performed by: ANESTHESIOLOGIST ASSISTANT

## 2024-04-22 DEVICE — ATTUNE PATELLA MEDIALIZED DOME 38MM CEMENTED AOX
Type: IMPLANTABLE DEVICE | Site: KNEE | Status: FUNCTIONAL
Brand: ATTUNE

## 2024-04-22 DEVICE — TIBAL BASE ATTUNE FB, SZ 7 CEM: Type: IMPLANTABLE DEVICE | Site: KNEE | Status: FUNCTIONAL

## 2024-04-22 DEVICE — SMARTSET HV HIGH VISCOSITY BONE CEMENT 40G
Type: IMPLANTABLE DEVICE | Site: KNEE | Status: FUNCTIONAL
Brand: SMARTSET

## 2024-04-22 DEVICE — ATTUNE KNEE SYSTEM FEMORAL POSTERIOR STABILIZED SIZE 8 LEFT CEMENTED
Type: IMPLANTABLE DEVICE | Site: KNEE | Status: FUNCTIONAL
Brand: ATTUNE

## 2024-04-22 DEVICE — ATTUNE KNEE SYSTEM TIBIAL INSERT FIXED BEARING POSTERIOR STABILIZED 8 7MM AOX
Type: IMPLANTABLE DEVICE | Site: KNEE | Status: FUNCTIONAL
Brand: ATTUNE

## 2024-04-22 RX ORDER — BISACODYL 5 MG
10 TABLET, DELAYED RELEASE (ENTERIC COATED) ORAL DAILY PRN
Status: DISCONTINUED | OUTPATIENT
Start: 2024-04-22 | End: 2024-04-22 | Stop reason: HOSPADM

## 2024-04-22 RX ORDER — CEFAZOLIN SODIUM 2 G/100ML
2 INJECTION, SOLUTION INTRAVENOUS ONCE
Status: COMPLETED | OUTPATIENT
Start: 2024-04-22 | End: 2024-04-22

## 2024-04-22 RX ORDER — PANTOPRAZOLE SODIUM 40 MG/1
40 TABLET, DELAYED RELEASE ORAL DAILY
Qty: 30 TABLET | Refills: 0 | Status: SHIPPED | OUTPATIENT
Start: 2024-04-22 | End: 2024-05-22

## 2024-04-22 RX ORDER — ONDANSETRON HYDROCHLORIDE 2 MG/ML
INJECTION, SOLUTION INTRAVENOUS AS NEEDED
Status: DISCONTINUED | OUTPATIENT
Start: 2024-04-22 | End: 2024-04-22

## 2024-04-22 RX ORDER — MELOXICAM 15 MG/1
15 TABLET ORAL DAILY
Qty: 30 TABLET | Refills: 0 | Status: SHIPPED | OUTPATIENT
Start: 2024-04-22 | End: 2024-05-22

## 2024-04-22 RX ORDER — OXYCODONE HYDROCHLORIDE 5 MG/1
10 TABLET ORAL EVERY 4 HOURS PRN
Status: DISCONTINUED | OUTPATIENT
Start: 2024-04-22 | End: 2024-04-22 | Stop reason: HOSPADM

## 2024-04-22 RX ORDER — MELOXICAM 7.5 MG/1
7.5 TABLET ORAL ONCE
Status: COMPLETED | OUTPATIENT
Start: 2024-04-22 | End: 2024-04-22

## 2024-04-22 RX ORDER — CYCLOBENZAPRINE HCL 10 MG
5 TABLET ORAL 3 TIMES DAILY PRN
Status: DISCONTINUED | OUTPATIENT
Start: 2024-04-22 | End: 2024-04-22 | Stop reason: HOSPADM

## 2024-04-22 RX ORDER — ACETAMINOPHEN 325 MG/1
650 TABLET ORAL EVERY 4 HOURS PRN
Status: DISCONTINUED | OUTPATIENT
Start: 2024-04-22 | End: 2024-04-22 | Stop reason: HOSPADM

## 2024-04-22 RX ORDER — ASPIRIN 81 MG/1
81 TABLET ORAL 2 TIMES DAILY
Status: DISCONTINUED | OUTPATIENT
Start: 2024-04-23 | End: 2024-04-22 | Stop reason: HOSPADM

## 2024-04-22 RX ORDER — OXYCODONE HYDROCHLORIDE 5 MG/1
5 TABLET ORAL EVERY 6 HOURS PRN
Qty: 28 TABLET | Refills: 0 | Status: SHIPPED | OUTPATIENT
Start: 2024-04-22 | End: 2024-04-29

## 2024-04-22 RX ORDER — BUPIVACAINE HYDROCHLORIDE 7.5 MG/ML
INJECTION, SOLUTION EPIDURAL; RETROBULBAR AS NEEDED
Status: DISCONTINUED | OUTPATIENT
Start: 2024-04-22 | End: 2024-04-22

## 2024-04-22 RX ORDER — ONDANSETRON HYDROCHLORIDE 2 MG/ML
4 INJECTION, SOLUTION INTRAVENOUS EVERY 8 HOURS PRN
Status: DISCONTINUED | OUTPATIENT
Start: 2024-04-22 | End: 2024-04-22 | Stop reason: HOSPADM

## 2024-04-22 RX ORDER — DOCUSATE SODIUM 100 MG/1
100 CAPSULE, LIQUID FILLED ORAL 2 TIMES DAILY
Status: DISCONTINUED | OUTPATIENT
Start: 2024-04-22 | End: 2024-04-22 | Stop reason: HOSPADM

## 2024-04-22 RX ORDER — PROPOFOL 10 MG/ML
INJECTION, EMULSION INTRAVENOUS AS NEEDED
Status: DISCONTINUED | OUTPATIENT
Start: 2024-04-22 | End: 2024-04-22

## 2024-04-22 RX ORDER — MIDAZOLAM HYDROCHLORIDE 1 MG/ML
INJECTION, SOLUTION INTRAMUSCULAR; INTRAVENOUS AS NEEDED
Status: DISCONTINUED | OUTPATIENT
Start: 2024-04-22 | End: 2024-04-22

## 2024-04-22 RX ORDER — DOCUSATE SODIUM 100 MG/1
100 CAPSULE, LIQUID FILLED ORAL 2 TIMES DAILY
Qty: 60 CAPSULE | Refills: 0 | Status: SHIPPED | OUTPATIENT
Start: 2024-04-22 | End: 2024-05-22

## 2024-04-22 RX ORDER — BISACODYL 10 MG/1
10 SUPPOSITORY RECTAL DAILY PRN
Status: DISCONTINUED | OUTPATIENT
Start: 2024-04-22 | End: 2024-04-22 | Stop reason: HOSPADM

## 2024-04-22 RX ORDER — OXYCODONE HCL 10 MG/1
10 TABLET, FILM COATED, EXTENDED RELEASE ORAL ONCE
Status: COMPLETED | OUTPATIENT
Start: 2024-04-22 | End: 2024-04-22

## 2024-04-22 RX ORDER — ACETAMINOPHEN 500 MG
1000 TABLET ORAL EVERY 6 HOURS PRN
Qty: 240 TABLET | Refills: 0 | Status: SHIPPED | OUTPATIENT
Start: 2024-04-22 | End: 2024-05-22

## 2024-04-22 RX ORDER — POLYETHYLENE GLYCOL 3350 17 G/17G
17 POWDER, FOR SOLUTION ORAL DAILY
Qty: 238 G | Refills: 0 | Status: SHIPPED | OUTPATIENT
Start: 2024-04-22

## 2024-04-22 RX ORDER — PANTOPRAZOLE SODIUM 40 MG/1
40 TABLET, DELAYED RELEASE ORAL
Status: DISCONTINUED | OUTPATIENT
Start: 2024-04-23 | End: 2024-04-22 | Stop reason: HOSPADM

## 2024-04-22 RX ORDER — METOCLOPRAMIDE 10 MG/1
10 TABLET ORAL EVERY 6 HOURS PRN
Status: DISCONTINUED | OUTPATIENT
Start: 2024-04-22 | End: 2024-04-22 | Stop reason: HOSPADM

## 2024-04-22 RX ORDER — SCOLOPAMINE TRANSDERMAL SYSTEM 1 MG/1
1 PATCH, EXTENDED RELEASE TRANSDERMAL
Status: DISCONTINUED | OUTPATIENT
Start: 2024-04-22 | End: 2024-04-22 | Stop reason: HOSPADM

## 2024-04-22 RX ORDER — DIPHENHYDRAMINE HYDROCHLORIDE 50 MG/ML
12.5 INJECTION INTRAMUSCULAR; INTRAVENOUS EVERY 6 HOURS PRN
Status: DISCONTINUED | OUTPATIENT
Start: 2024-04-22 | End: 2024-04-22 | Stop reason: HOSPADM

## 2024-04-22 RX ORDER — METOCLOPRAMIDE HYDROCHLORIDE 5 MG/ML
10 INJECTION INTRAMUSCULAR; INTRAVENOUS EVERY 6 HOURS PRN
Status: DISCONTINUED | OUTPATIENT
Start: 2024-04-22 | End: 2024-04-22 | Stop reason: HOSPADM

## 2024-04-22 RX ORDER — MIDAZOLAM HYDROCHLORIDE 1 MG/ML
2 INJECTION, SOLUTION INTRAMUSCULAR; INTRAVENOUS ONCE
Status: COMPLETED | OUTPATIENT
Start: 2024-04-22 | End: 2024-04-22

## 2024-04-22 RX ORDER — ROPIVACAINE/EPI/CLONIDINE/KET 2.46-0.005
SYRINGE (ML) INJECTION AS NEEDED
Status: DISCONTINUED | OUTPATIENT
Start: 2024-04-22 | End: 2024-04-22 | Stop reason: HOSPADM

## 2024-04-22 RX ORDER — PREGABALIN 75 MG/1
75 CAPSULE ORAL ONCE
Status: COMPLETED | OUTPATIENT
Start: 2024-04-22 | End: 2024-04-22

## 2024-04-22 RX ORDER — ACETAMINOPHEN 325 MG/1
650 TABLET ORAL EVERY 6 HOURS PRN
Status: DISCONTINUED | OUTPATIENT
Start: 2024-04-22 | End: 2024-04-22 | Stop reason: HOSPADM

## 2024-04-22 RX ORDER — ONDANSETRON 4 MG/1
4 TABLET, ORALLY DISINTEGRATING ORAL EVERY 8 HOURS PRN
Status: DISCONTINUED | OUTPATIENT
Start: 2024-04-22 | End: 2024-04-22 | Stop reason: HOSPADM

## 2024-04-22 RX ORDER — CEFAZOLIN SODIUM 2 G/100ML
2 INJECTION, SOLUTION INTRAVENOUS EVERY 8 HOURS
Qty: 200 ML | Refills: 0 | Status: DISCONTINUED | OUTPATIENT
Start: 2024-04-22 | End: 2024-04-22 | Stop reason: HOSPADM

## 2024-04-22 RX ORDER — HYDROMORPHONE HYDROCHLORIDE 1 MG/ML
1 INJECTION, SOLUTION INTRAMUSCULAR; INTRAVENOUS; SUBCUTANEOUS EVERY 2 HOUR PRN
Status: DISCONTINUED | OUTPATIENT
Start: 2024-04-22 | End: 2024-04-22 | Stop reason: HOSPADM

## 2024-04-22 RX ORDER — TRANEXAMIC ACID 100 MG/ML
INJECTION, SOLUTION INTRAVENOUS AS NEEDED
Status: DISCONTINUED | OUTPATIENT
Start: 2024-04-22 | End: 2024-04-22

## 2024-04-22 RX ORDER — FENTANYL CITRATE 50 UG/ML
50 INJECTION, SOLUTION INTRAMUSCULAR; INTRAVENOUS ONCE
Status: COMPLETED | OUTPATIENT
Start: 2024-04-22 | End: 2024-04-22

## 2024-04-22 RX ORDER — FENTANYL CITRATE 50 UG/ML
INJECTION, SOLUTION INTRAMUSCULAR; INTRAVENOUS AS NEEDED
Status: DISCONTINUED | OUTPATIENT
Start: 2024-04-22 | End: 2024-04-22

## 2024-04-22 RX ORDER — SODIUM CHLORIDE, SODIUM LACTATE, POTASSIUM CHLORIDE, CALCIUM CHLORIDE 600; 310; 30; 20 MG/100ML; MG/100ML; MG/100ML; MG/100ML
100 INJECTION, SOLUTION INTRAVENOUS CONTINUOUS
Status: DISCONTINUED | OUTPATIENT
Start: 2024-04-22 | End: 2024-04-22 | Stop reason: HOSPADM

## 2024-04-22 RX ORDER — OXYCODONE HYDROCHLORIDE 5 MG/1
5 TABLET ORAL EVERY 4 HOURS PRN
Status: DISCONTINUED | OUTPATIENT
Start: 2024-04-22 | End: 2024-04-22 | Stop reason: HOSPADM

## 2024-04-22 RX ORDER — SODIUM CHLORIDE, SODIUM LACTATE, POTASSIUM CHLORIDE, CALCIUM CHLORIDE 600; 310; 30; 20 MG/100ML; MG/100ML; MG/100ML; MG/100ML
50 INJECTION, SOLUTION INTRAVENOUS CONTINUOUS
Status: DISCONTINUED | OUTPATIENT
Start: 2024-04-22 | End: 2024-04-22 | Stop reason: HOSPADM

## 2024-04-22 RX ORDER — ALBUTEROL SULFATE 0.83 MG/ML
2.5 SOLUTION RESPIRATORY (INHALATION) ONCE AS NEEDED
Status: DISCONTINUED | OUTPATIENT
Start: 2024-04-22 | End: 2024-04-22 | Stop reason: HOSPADM

## 2024-04-22 RX ORDER — ACETAMINOPHEN 325 MG/1
975 TABLET ORAL ONCE
Status: COMPLETED | OUTPATIENT
Start: 2024-04-22 | End: 2024-04-22

## 2024-04-22 RX ORDER — ONDANSETRON HYDROCHLORIDE 2 MG/ML
4 INJECTION, SOLUTION INTRAVENOUS ONCE AS NEEDED
Status: COMPLETED | OUTPATIENT
Start: 2024-04-22 | End: 2024-04-22

## 2024-04-22 RX ORDER — NALOXONE HYDROCHLORIDE 0.4 MG/ML
0.2 INJECTION, SOLUTION INTRAMUSCULAR; INTRAVENOUS; SUBCUTANEOUS EVERY 5 MIN PRN
Status: DISCONTINUED | OUTPATIENT
Start: 2024-04-22 | End: 2024-04-22 | Stop reason: HOSPADM

## 2024-04-22 RX ORDER — ASPIRIN 81 MG/1
81 TABLET ORAL 2 TIMES DAILY
Qty: 60 TABLET | Refills: 0 | Status: SHIPPED | OUTPATIENT
Start: 2024-04-22 | End: 2024-05-22

## 2024-04-22 RX ORDER — TRAMADOL HYDROCHLORIDE 50 MG/1
50 TABLET ORAL EVERY 6 HOURS PRN
Qty: 28 TABLET | Refills: 0 | Status: SHIPPED | OUTPATIENT
Start: 2024-04-22 | End: 2024-04-29

## 2024-04-22 RX ORDER — SODIUM CHLORIDE, SODIUM LACTATE, POTASSIUM CHLORIDE, CALCIUM CHLORIDE 600; 310; 30; 20 MG/100ML; MG/100ML; MG/100ML; MG/100ML
75 INJECTION, SOLUTION INTRAVENOUS CONTINUOUS
Status: DISCONTINUED | OUTPATIENT
Start: 2024-04-22 | End: 2024-04-22 | Stop reason: HOSPADM

## 2024-04-22 RX ORDER — POLYETHYLENE GLYCOL 3350 17 G/17G
17 POWDER, FOR SOLUTION ORAL DAILY
Status: DISCONTINUED | OUTPATIENT
Start: 2024-04-23 | End: 2024-04-22 | Stop reason: HOSPADM

## 2024-04-22 RX ADMIN — MELOXICAM 7.5 MG: 7.5 TABLET ORAL at 05:45

## 2024-04-22 RX ADMIN — SODIUM CHLORIDE, SODIUM LACTATE, POTASSIUM CHLORIDE, AND CALCIUM CHLORIDE 75 ML/HR: 600; 310; 30; 20 INJECTION, SOLUTION INTRAVENOUS at 05:51

## 2024-04-22 RX ADMIN — FENTANYL CITRATE 50 MCG: 50 INJECTION INTRAMUSCULAR; INTRAVENOUS at 06:55

## 2024-04-22 RX ADMIN — MIDAZOLAM HYDROCHLORIDE 2 MG: 1 INJECTION, SOLUTION INTRAMUSCULAR; INTRAVENOUS at 06:55

## 2024-04-22 RX ADMIN — MIDAZOLAM 1 MG: 1 INJECTION INTRAMUSCULAR; INTRAVENOUS at 07:20

## 2024-04-22 RX ADMIN — OXYCODONE HYDROCHLORIDE 10 MG: 5 TABLET ORAL at 13:07

## 2024-04-22 RX ADMIN — PREGABALIN 75 MG: 75 CAPSULE ORAL at 06:00

## 2024-04-22 RX ADMIN — DEXAMETHASONE SODIUM PHOSPHATE 4 MG: 4 INJECTION, SOLUTION INTRAMUSCULAR; INTRAVENOUS at 08:14

## 2024-04-22 RX ADMIN — POVIDONE-IODINE 1 APPLICATION: 5 SOLUTION TOPICAL at 05:51

## 2024-04-22 RX ADMIN — PROPOFOL 460 MG: 10 INJECTION, EMULSION INTRAVENOUS at 07:16

## 2024-04-22 RX ADMIN — SCOPALAMINE 1 PATCH: 1 PATCH, EXTENDED RELEASE TRANSDERMAL at 06:00

## 2024-04-22 RX ADMIN — PROPOFOL 40 MG: 10 INJECTION, EMULSION INTRAVENOUS at 07:15

## 2024-04-22 RX ADMIN — BUPIVACAINE HYDROCHLORIDE 1.4 ML: 7.5 INJECTION, SOLUTION EPIDURAL; RETROBULBAR at 07:10

## 2024-04-22 RX ADMIN — FENTANYL CITRATE 25 MCG: 50 INJECTION INTRAMUSCULAR; INTRAVENOUS at 08:13

## 2024-04-22 RX ADMIN — ONDANSETRON 4 MG: 2 INJECTION INTRAMUSCULAR; INTRAVENOUS at 10:23

## 2024-04-22 RX ADMIN — FENTANYL CITRATE 25 MCG: 50 INJECTION INTRAMUSCULAR; INTRAVENOUS at 08:22

## 2024-04-22 RX ADMIN — TRANEXAMIC ACID 1000 MG: 1 INJECTION, SOLUTION INTRAVENOUS at 08:14

## 2024-04-22 RX ADMIN — HYDROMORPHONE HYDROCHLORIDE 0.4 MG: 1 INJECTION, SOLUTION INTRAMUSCULAR; INTRAVENOUS; SUBCUTANEOUS at 08:50

## 2024-04-22 RX ADMIN — MIDAZOLAM 1 MG: 1 INJECTION INTRAMUSCULAR; INTRAVENOUS at 07:04

## 2024-04-22 RX ADMIN — MIDAZOLAM 1 MG: 1 INJECTION INTRAMUSCULAR; INTRAVENOUS at 07:26

## 2024-04-22 RX ADMIN — FENTANYL CITRATE 25 MCG: 50 INJECTION INTRAMUSCULAR; INTRAVENOUS at 07:30

## 2024-04-22 RX ADMIN — ONDANSETRON 4 MG: 2 INJECTION INTRAMUSCULAR; INTRAVENOUS at 08:14

## 2024-04-22 RX ADMIN — ACETAMINOPHEN 975 MG: 325 TABLET ORAL at 05:59

## 2024-04-22 RX ADMIN — OXYCODONE HYDROCHLORIDE 10 MG: 10 TABLET, FILM COATED, EXTENDED RELEASE ORAL at 06:45

## 2024-04-22 RX ADMIN — CEFAZOLIN SODIUM 2 G: 2 INJECTION, SOLUTION INTRAVENOUS at 07:12

## 2024-04-22 RX ADMIN — MIDAZOLAM 1 MG: 1 INJECTION INTRAMUSCULAR; INTRAVENOUS at 07:23

## 2024-04-22 RX ADMIN — TRANEXAMIC ACID 1000 MG: 1 INJECTION, SOLUTION INTRAVENOUS at 07:18

## 2024-04-22 RX ADMIN — FENTANYL CITRATE 25 MCG: 50 INJECTION INTRAMUSCULAR; INTRAVENOUS at 08:24

## 2024-04-22 ASSESSMENT — PAIN SCALES - GENERAL
PAINLEVEL_OUTOF10: 2
PAINLEVEL_OUTOF10: 2
PAINLEVEL_OUTOF10: 6
PAINLEVEL_OUTOF10: 5 - MODERATE PAIN
PAINLEVEL_OUTOF10: 3
PAINLEVEL_OUTOF10: 0 - NO PAIN
PAINLEVEL_OUTOF10: 3
PAINLEVEL_OUTOF10: 2
PAINLEVEL_OUTOF10: 5 - MODERATE PAIN
PAINLEVEL_OUTOF10: 2
PAINLEVEL_OUTOF10: 7
PAINLEVEL_OUTOF10: 7

## 2024-04-22 ASSESSMENT — PAIN - FUNCTIONAL ASSESSMENT
PAIN_FUNCTIONAL_ASSESSMENT: 0-10

## 2024-04-22 ASSESSMENT — COGNITIVE AND FUNCTIONAL STATUS - GENERAL: MOBILITY SCORE: 24

## 2024-04-22 ASSESSMENT — COLUMBIA-SUICIDE SEVERITY RATING SCALE - C-SSRS
1. IN THE PAST MONTH, HAVE YOU WISHED YOU WERE DEAD OR WISHED YOU COULD GO TO SLEEP AND NOT WAKE UP?: NO
2. HAVE YOU ACTUALLY HAD ANY THOUGHTS OF KILLING YOURSELF?: NO
6. HAVE YOU EVER DONE ANYTHING, STARTED TO DO ANYTHING, OR PREPARED TO DO ANYTHING TO END YOUR LIFE?: NO

## 2024-04-22 ASSESSMENT — ACTIVITIES OF DAILY LIVING (ADL)
LACK_OF_TRANSPORTATION: NO
ADLS_ADDRESSED: YES
ADL_ASSISTANCE: INDEPENDENT

## 2024-04-22 ASSESSMENT — PAIN DESCRIPTION - DESCRIPTORS: DESCRIPTORS: DISCOMFORT

## 2024-04-22 NOTE — PROGRESS NOTES
Met with Patient and Care Partner at bedside- Patient is s/p Left Total Knee Replacement with Dr. Eran Vaughn.  Discussion with patient included education on the following topics: [facility] Education: Wound Care, Post-Op Activity, Post-Op Precautions, Cold-Therapy, Importance of post-op prescriptions, When to call the Surgeon's Office, Use of MyChart, and When to call 9-1-1.  Patient Did Complete and Live class prior to surgery.  Patient is able to verbalize understanding of class content/discussion.  Contact information was provided to patient for support and assistance during the post-operative period.

## 2024-04-22 NOTE — CARE PLAN
Problem: Mobility  Goal: LTG - Patient will ambulate household distance with rolling walker at distant supervision  Outcome: Progressing     Problem: Balance  Goal: LTG - Patient will maintain standing and sitting balance to allow for completion of daily activities  Outcome: Met     Problem: Mobility  Goal: LTG - Patient will navigate 2 steps with R rail at close supervision  Outcome: Met     Problem: Safety  Goal: LTG - Patient will demonstrate safety requirements appropriate to situation/environment  Outcome: Met     Problem: PT Transfers  Goal: LTG - Patient will demonstrate safe transfer techniques with rolling walker at distant supervision  Outcome: Met     Problem: Pain  Goal: LTG - Patient will manage pain with the appropriate technique/Intervention  Outcome: Met     Problem: Compromised Skin Integrity  Goal: LTG - Patient will be free from infection  Outcome: Met

## 2024-04-22 NOTE — OP NOTE
Knee Replacement Total Cement Unilat ( DePuy Attune Knee, Pineapple [place] ) *Rapid Recovery* (L) Operative Note     Date: 2024  OR Location: [facility]    Name: Amarjit Hernandez : 1970, Age: 53 y.o., MRN: [id number], Sex: male    Diagnosis  Pre-op Diagnosis     * Unilateral primary osteoarthritis, left knee [M17.12] Post-op Diagnosis     * Unilateral primary osteoarthritis, left knee [M17.12]     Procedures  Knee Replacement Total Cement Unilat ( DePuy Attune Knee, Pineapple [place] ) *Rapid Recovery*  08040 - PA ARTHRP KNE CONDYLE&PLATU MEDIAL&LAT COMPARTMENTS      Surgeons      * Eran Vaughn - Primary    Resident/Fellow/Other Assistant:  Surgeons and Role:  * No surgeons found with a matching role *    Procedure Summary  Anesthesia: Consult  ASA: ASA status not filed in the log.  Anesthesia Staff: Anesthesiologist: Sophia San MD  C-AA: ROMERO Ball  Estimated Blood Loss: 25mL  Intra-op Medications:   Administrations occurring from 0700 to 0900 on 24:   Medication Name Total Dose   ropivacaine-epinephrine-clonidine-ketorolac 2.46-0.005- 0.0008-0.3mg/mL periarticular syringe 50 mL   ceFAZolin in dextrose (iso-os) (Ancef) IVPB 2 g 2 g              Anesthesia Record               Intraprocedure I/O Totals          Intake    Tranexamic Acid 0.00 mL    The total shown is the total volume documented since Anesthesia Start was filed.    Total Intake 0 mL          Specimen: No specimens collected     Staff:   Circulator: Ciera Juárez RN  Scrub Person: Ani Munoz; Jennifer Chowdary         Drains and/or Catheters: * None in log *    Tourniquet Times:   * Missing tourniquet times found for documented tourniquets in lo *     Implants:  Implants       Type Name Action Serial No.      Joint Knee FEMORAL, ATTUNE PS, MISTY, SZ 8, LT - [id number] Implanted      Joint Knee INSERT, ATTUNE PS FB, SZ 8, 7MM - [id number] Implanted      Joint Knee TIBAL BASE ATTUNE FB, SZ 7 MISTY - [id number]  Implanted      Joint Knee DOME, PATELLA, MEDIALIZED, 38MM - [id number] Implanted      Joint Knee BONE CEMENT, SMART SET, HIGH VISCOSITY, 40GM - [id number] Implanted      Joint Knee BONE CEMENT, SMART SET, HIGH VISCOSITY, 40GM - [id number] Implanted               Findings: advanced OA    Indications: Amarjit Hernandez is an 53 y.o. male who is having surgery for Unilateral primary osteoarthritis, left knee [M17.12].     The patient was seen in the preoperative area. The risks, benefits, complications, treatment options, non-operative alternatives, expected recovery and outcomes were discussed with the patient. The possibilities of reaction to medication, pulmonary aspiration, injury to surrounding structures, bleeding, recurrent infection, the need for additional procedures, failure to diagnose a condition, and creating a complication requiring transfusion or operation were discussed with the patient. The patient concurred with the proposed plan, giving informed consent.  The site of surgery was properly noted/marked if necessary per policy. The patient has been actively warmed in preoperative area. Preoperative antibiotics have been ordered and given within 1 hours of incision. Venous thrombosis prophylaxis have been ordered including bilateral sequential compression devices    Procedure Details: L TKA  Complications:  None; patient tolerated the procedure well.    Disposition: PACU - hemodynamically stable.  Condition: stable     PREOPERATIVE DIAGNOSIS:  left knee osteoarthritis     POSTOPERATIVE DIAGNOSIS:  left knee osteoarthritis     OPERATION/PROCEDURE:  left total knee arthroplasty     SURGEON:  Eran Vaughn MD     ASSISTANT(S):  Benito    The patient's surgery was assisted by CULLEN Sarmiento, due to lack of availability of a qualified resident to assist with the surgery.  Luna Oliver was present for the essential parts of the procedure.  She acted as first assistant and assisted with preparing the leg,  draping the leg, exposing the knee, retracting and manipulating the leg during surgery, facilitating safe performance of the procedure, and closure of the wound.     ANESTHESIA:  spinal     LOCATION:  [facility]     ESTIMATED BLOOD LOSS AND INTRAVENOUS FLUIDS:  Please see Anesthesia record.     COMPONENTS USED:  DePuy Attune knee system  1. 8 femur  2. 7 tibia  3. 38 patella  4. 7mm insert     BRIEF CLINICAL NOTE:  The patient is a ASA with advanced osteoarthritis of  their left knee.  They failed conservative treatment and wished to  proceed with total knee arthroplasty which is indicated at this time.  We discussed the risks, benefits, and alternatives of surgery  including but not limited to infection, damage to vessel or nerve,  bleeding, soft tissue pain, DVT, PE, problems with anesthesia, lack  of range of motion, continued soft tissue pain, need for further  surgery, etc.  Consent was obtained.  They were taken to the operating  room in order to undergo the procedure.    PRE-OP ROM:      OPERATIVE REPORT:  The patient was transferred to the operating room table.  Time-out  was performed confirming patient name, medical record number,  surgical site, and adequate and appropriate imaging.  The patient  received appropriate IV antibiotics as well as tranexamic acid.  Once we were prepped and draped,midline skin incision was performed.  Hemostasis was obtained using electrocautery.  Underlying extensor mechanism was easily identified and entered using a standard medial parapatellar arthrotomy performedsharply.  The infrapatellar and suprapatellar fat pads were debrided.Initial medial release was performed.  The patella was subluxed laterally.  Distal femur was entered using a step drill.  Distal  femoral cut was performed, and the femur was sized and prepared.  The tibia was subluxed forward using a double-prong PCL retractor.  The proximal tibia was cut in neutral mechanical axis using an  extramedullary tibial  guide.  We then used the lamina  to clear out the posterior osteophytes and clear the meniscal remnants. We then sized the tibia and prepared it. We cut the patella freehand using a caliper to restore the native patellar height. We then trialed with multiple polyethylene inserts.  Once I was happy with the position of components and stability of the knee, all trial components were removed.  The  cut bony surfaces were thoroughly irrigated and dried.  We then cemented into place the real components.  The knee was held in extension until all cement was hardened.  All extraneous cement was  removed.  We then closed the extensor mechanism over a drain using interrupted #2 Ethibond as well as interrupted 0 Vicryl.  The subcu was closed with interrupted 2-0 Vicryl.  The skin was closed with  running 3-0 Biosyn followed by Dermabond and Steri-Strips.  Dry sterile dressing was placed.  The patient was transferred back to the hospital bed without incident or complication.  She will be  weightbearing as tolerated.  They will be on ASA and SCDs for DVT  prophylaxis.     Additional Details: WBAT, ASA    Attending Attestation: I was present and scrubbed for the key portions of the procedure.

## 2024-04-22 NOTE — PERIOPERATIVE NURSING NOTE
Pt did well in recovery. Pt with movement in bilateral lower ext. Fair push pull. Pain tolerable, no n/v. Pt ready for phase 2  rapid recovery.

## 2024-04-22 NOTE — PROGRESS NOTES
04/22/24 1006   Discharge Planning   Living Arrangements Spouse/significant other   Support Systems Spouse/significant other   Assistance Needed HOME with [facility]/24/7 supervision   Type of Residence Private residence   Number of Stairs to Enter Residence 2   Number of Stairs Within Residence   (bed/bath on first)   Do you have animals or pets at home? Yes   Type of Animals or Pets 1 dog 1 cat   Who is requesting discharge planning? Provider   Home or Post Acute Services In home services   Type of Home Care Services Home PT   Patient expects to be discharged to: HOME with [facility] and 24/7 supervision   Does the patient need discharge transport arranged? No   Financial Resource Strain   How hard is it for you to pay for the very basics like food, housing, medical care, and heating? Not hard   Housing Stability   In the last 12 months, was there a time when you were not able to pay the mortgage or rent on time? N   In the last 12 months, how many places have you lived? 1   In the last 12 months, was there a time when you did not have a steady place to sleep or slept in a shelter (including now)? N   Transportation Needs   In the past 12 months, has lack of transportation kept you from medical appointments or from getting medications? no   In the past 12 months, has lack of transportation kept you from meetings, work, or from getting things needed for daily living? No   Patient Choice   Patient / Family choosing to utilize agency / facility established prior to hospitalization Yes     Message sent to [facility] to confirm SOC.  Await confirmation.

## 2024-04-22 NOTE — ANESTHESIA POSTPROCEDURE EVALUATION
Patient: Amarjit Hernandez    Procedure Summary       Date: 04/22/24 Room / Location: [facility] 05 / [facility]    Anesthesia Start: 0702 Anesthesia Stop: 0843    Procedure: Knee Replacement Total Cement Unilat ( DePuy Attune Knee, Pineapple [place] ) *Rapid Recovery* (Left: Knee) Diagnosis:       Unilateral primary osteoarthritis, left knee      (Unilateral primary osteoarthritis, left knee [M17.12])    Surgeons: Eran Vaughn MD Responsible Provider: Sophia San MD    Anesthesia Type: spinal ASA Status: 2            Anesthesia Type: spinal    Vitals Value Taken Time   /74 04/22/24 0920   Temp 36.3 °C (97.3 °F) 04/22/24 0920   Pulse 72 04/22/24 0920   Resp 16 04/22/24 0920   SpO2 93 % 04/22/24 0920       Anesthesia Post Evaluation    Patient location during evaluation: bedside  Patient participation: complete - patient participated  Level of consciousness: awake and alert  Pain management: adequate  Multimodal analgesia pain management approach  Airway patency: patent  Cardiovascular status: acceptable  Respiratory status: acceptable  Hydration status: acceptable  Postoperative Nausea and Vomiting: none        There were no known notable events for this encounter.

## 2024-04-22 NOTE — ANESTHESIA PROCEDURE NOTES
Spinal Block    Patient location during procedure: OR  Start time: 4/22/2024 7:05 AM  End time: 4/22/2024 7:10 AM  Reason for block: primary anesthetic  Staffing  Performed: ROMERO   Authorized by: Sophia San MD    Performed by: ROMERO Ball    Preanesthetic Checklist  Completed: patient identified, IV checked, risks and benefits discussed, surgical consent, monitors and equipment checked, pre-op evaluation, timeout performed and sterile techniques followed  Block Timeout  RN/Licensed healthcare professional reads aloud to the Anesthesia provider and entire team: Patient identity, procedure with side and site, patient position, and as applicable the availability of implants/special equipment/special requirements.    Timeout performed at: 4/22/2024 7:05 AM  Spinal Block  Patient position: sitting  Prep: Betadine  Sterility prep: cap, drape, gloves, hand hygiene and mask  Sedation level: light sedation  Patient monitoring: blood pressure, continuous pulse oximetry and heart rate  Approach: midline  Vertebral space: L3-4  Injection technique: single-shot  Needle  Needle type: pencil-point   Needle gauge: 25 G  Needle length: 3.5 in  Free flowing CSF: yes    Assessment  Sensory level: T10 bilateral  Procedure assessment: patient sedated but conversant throughout procedure and patient tolerated procedure well with no immediate complications  Additional Notes  1.4ml 0.75% marcaine in dextrose injected  Spinal kit exp: 01-  Lot# [id number]

## 2024-04-22 NOTE — DISCHARGE SUMMARY
MD Luna Garcia, MPAS, PAKyleC, ATC  Adult Reconstruction and Joint Replacement Surgery  Phone: [phone]     Fax:[phone]             Discharge Summary    Discharge Diagnosis  Left Total Knee Arthroplasty     Issues Requiring Follow-Up  Home care services to start within 48 hours. Outpatient PT to start 2 weeks  S/P total Joint for Knees only. Hips optional.    Test Results Pending At Discharge  Pending Labs       No current pending labs.          Hospital Course  Patient underwent Left Total Knee Arthroplasty  on 4/22/24 without complications. The patient was then taken to the PACU in stable condition. Patient was then transferred to the or.  Pain was appropriately controlled. Diet was advanced as tolerated. Patient progressed adequately through their recovery during hospital stay including PT/ OT and were recommended for discharge. Patient was then discharged on  to home in stable condition. Patient had uneventful hospital course. Patient was instructed on the use of pain medications as needed for pain. The signs and symptoms of infection were discussed and the patient was given our number to call should they have any questions or concerns following discharge.    Based on my clinical judgment, the patient was provided with a 7-day prescription for opioid medication at 30 MED, indicated for treatment of acute pain in the setting of recent Total Joint Arthroplasty. OARRS report was run and has demonstrated an appropriate time course.  The patient has been provided with counseling pertaining to safe use of opioid medication.    Patient may use operative extremity WBAT with use of walker for assistance with ambulation .  Mepilex dressing to be removed POD # 7 by home care and incision left open to air  OAC for DVT prophylaxis started on POD #1 and to be taken for 30 days    Patient is to follow-up in 6 weeks at scheduled post-op visit.     Face-to Face after surgery progress  note  Pertinent Physical Exam At Time of Discharge  Review of Systems   Constitutional: Negative.  Negative for activity change, chills, fatigue and fever.   HENT: Negative.     Eyes: Negative.    Respiratory: Negative.  Negative for cough, chest tightness, shortness of breath and wheezing.    Cardiovascular: Negative.  Negative for palpitations.   Gastrointestinal:  Negative for abdominal pain, blood in stool, nausea and vomiting.   Endocrine: Negative.  Negative for cold intolerance and polyuria.   Genitourinary: Negative.  Negative for difficulty urinating, dysuria, frequency, hematuria and urgency.   Musculoskeletal:  Positive for gait problem and joint swelling. Negative for arthralgias and back pain.   Skin: Negative.  Negative for color change, pallor, rash and wound.   Allergic/Immunologic: Negative.  Negative for environmental allergies.   Neurological:  Negative for dizziness, weakness and light-headedness.   Hematological: Negative.    Psychiatric/Behavioral:  Negative for agitation, confusion and suicidal ideas. The patient is not nervous/anxious.    All other systems reviewed and are negative    Physical Exam  side: left knee  Vitals and nursing note reviewed. VSS, Afebrile  Constitutional:       Appearance: Normal appearance, awake and alert.  HENT:      Head: Normocephalic and atraumatic.       Pupils: Pupils are equal, round, and reactive to light.   Cardiovascular:      Rate and Rhythm: Normal rate and regular rhythm.   Pulmonary:      Effort: Pulmonary effort is normal.     Abdominal:         Palpations: Abdomen is soft.   Musculoskeletal:   Sensation intact bilaterally, sural/saph/sp/tibal n.  Motor intact flexion/extension/DF/PF/EHL/FHL bilaterally. Palpable symmetric DP/PT pulse bilaterally. Spinal wearing off.    Skin:      Bulky Dressing intact to the surgical extremity. No signs of gross bloody or purulent drainage.     General: Skin is warm and dry.      Capillary Refill: Capillary refill  takes less than 2 seconds.   Neurological:      General: No focal deficit present.      Mental Status: She is alert and oriented to person, place, and time. Mental status is at baseline.   Psychiatric:         Mood and Affect: Mood normal.        Home Medications  Scheduled medications    Current Facility-Administered Medications:     ceFAZolin in dextrose (iso-os) (Ancef) IVPB 2 g, 2 g, intravenous, Once, Luna Oliver PA-C    fentaNYL PF (Sublimaze) injection 50 mcg, 50 mcg, intravenous, Once, Sophia San MD    lactated Ringer's infusion, 75 mL/hr, intravenous, Continuous, Luna Oliver PA-C, Last Rate: 75 mL/hr at 04/22/24 0551, 75 mL/hr at 04/22/24 0551    midazolam (Versed) injection 2 mg, 2 mg, intravenous, Once, Sophia San MD    oxyCODONE ER (OxyCONTIN) 12 hr tablet 10 mg, 10 mg, oral, Once, Luna Oliver PA-C    scopolamine (Transderm-Scop) patch 1 patch, 1 patch, transdermal, q72h, Luna Oliver PA-C, 1 patch at 04/22/24 0600     PRN medications      Discharge medications     Your medication list        START taking these medications        Instructions Last Dose Given Next Dose Due   acetaminophen 500 mg tablet  Commonly known as: Tylenol Extra Strength      Take 2 tablets (1,000 mg) by mouth every 6 hours if needed for mild pain (1 - 3).       aspirin 81 mg EC tablet      Take 1 tablet (81 mg) by mouth 2 times a day.       docusate sodium 100 mg capsule  Commonly known as: Colace      Take 1 capsule (100 mg) by mouth 2 times a day.       meloxicam 15 mg tablet  Commonly known as: Mobic      Take 1 tablet (15 mg) by mouth once daily.       oxyCODONE 5 mg immediate release tablet  Commonly known as: Roxicodone      Take 1 tablet (5 mg) by mouth every 6 hours if needed for severe pain (7 - 10) for up to 7 days.       pantoprazole 40 mg EC tablet  Commonly known as: ProtoNix      Take 1 tablet (40 mg) by mouth once daily. Do not crush, chew, or split.       polyethylene glycol 17 gram  packet  Commonly known as: Glycolax, Miralax      Take 17 g by mouth once daily. Mix 1 cap (17g) into 8 ounces of fluid.       traMADol 50 mg tablet  Commonly known as: Ultram      Take 1 tablet (50 mg) by mouth every 6 hours if needed for severe pain (7 - 10) for up to 7 days.              STOP taking these medications      chlorhexidine 0.12 % solution  Commonly known as: Peridex        Euflexxa 10 mg/mL(mw 2.4 -3.6 million) injection  Generic drug: sodium hyaluronate                  Where to Get Your Medications        These medications were sent to [facility]  [address] , [address], [facility] [address]      Hours: 8 AM to 6 PM Mon-Fri, 9 AM to 1 PM Saturday Phone: [phone]   acetaminophen 500 mg tablet  aspirin 81 mg EC tablet  docusate sodium 100 mg capsule  meloxicam 15 mg tablet  oxyCODONE 5 mg immediate release tablet  pantoprazole 40 mg EC tablet  polyethylene glycol 17 gram packet  traMADol 50 mg tablet         You have not been prescribed any medications.     Outpatient Follow-Up  Patient to follow-up with /Luna Oliver PA-C.  Thank you for trusting us with your care. You should be scheduled for a follow-up post-surgical visit in 6 weeks.    Special Instructions      Please read discharge instructions provided by your surgeon before calling with questions as this will delay care.    Medication refills-Oxycodone and Tramadol will be refilled every 7 days per state law. Request refills through [facility] at the institution in which you had surgery or MyChart. All medication requests may take up to 72 hours to refill and refills after Friday 1pm will be refilled on the next business day.      No future appointments.    MELISSA Meléndez, PA-C ATC  Orthopedic Physician Assisant  Adult Reconstruction and Total Joint Replacement  General Orthopedics  Department of Orthopaedic Surgery  [address]  [address] [facility]

## 2024-04-22 NOTE — ANESTHESIA PREPROCEDURE EVALUATION
Patient: Amarjit Hernandez    Procedure Information       Anesthesia Start Date/Time: 04/22/24 0702    Procedure: Knee Replacement Total Cement Unilat ( DePuy Attune Knee, Pineapple Climax ) *Rapid Recovery* (Left: Knee) - DePuy Attune Knee, Pineapple Meenu    Location: [facility] / [facility]    Surgeons: Eran Vaughn MD            Relevant Problems   Anesthesia (within normal limits)      Cardiac (within normal limits)      Pulmonary (within normal limits)      Neuro (within normal limits)      GI (within normal limits)      /Renal (within normal limits)      Liver (within normal limits)      Endocrine (within normal limits)      Hematology (within normal limits)      Musculoskeletal   (+) Primary osteoarthritis of left knee   (+) Unilateral primary osteoarthritis, left knee      HEENT (within normal limits)      ID (within normal limits)      Skin (within normal limits)      GYN (within normal limits)     Past Medical History:   Diagnosis Date    Arthritis      Past Surgical History:   Procedure Laterality Date    COLONOSCOPY      FOOT SURGERY      OTHER SURGICAL HISTORY  06/10/2021    Knee arthroscopy     No Known Allergies      Clinical information reviewed:   Tobacco  Allergies  Meds   Med Hx  Surg Hx   Fam Hx  Soc Hx        NPO Detail:  NPO/Void Status  Date of Last Liquid: 04/22/24  Time of Last Liquid: 0300 (black coffee)  Date of Last Solid: 04/21/24  Time of Last Solid: 2000  Last Intake Type: Light meal  Time of Last Void: 0500         Physical Exam    Airway  Mallampati: I  TM distance: >3 FB  Neck ROM: full     Cardiovascular   Rhythm: regular  Rate: normal     Dental    Pulmonary   Breath sounds clear to auscultation     Abdominal            Anesthesia Plan    History of general anesthesia?: yes  History of complications of general anesthesia?: no    ASA 2     regional and spinal     Postoperative administration of opioids is intended.  Anesthetic plan and risks discussed with  patient.  Use of blood products discussed with patient who.

## 2024-04-22 NOTE — PROGRESS NOTES
Physical Therapy Evaluation & Treatment    Patient Name: Amarjit Hernandez  MRN: [id number]  Today's Date: 4/22/2024   Time Calculation  Start Time: 1249  Stop Time: 1332  Time Calculation (min): 43 min    Assessment/Plan   PT Assessment  PT Assessment Results: Decreased strength, Decreased range of motion, Decreased mobility, Impaired sensation, Orthopedic restrictions, Pain  Rehab Prognosis: Excellent  Evaluation/Treatment Tolerance: Patient tolerated treatment well  Strengths: Ability to acquire knowledge, Access to adaptive/assistive products, Attitude of self, Capable of completing ADLs semi/independent, Housing layout, Insight into problems, Premorbid level of function, Support of Caregivers  End of Session Communication: Bedside nurse  Assessment Comment: Pt low complexity eval presenting with increased pain, impaired sensation, and deficits to functional mobility following L TKA performed on 4/22/2024. Education regarding TKA precautions provided; pt verbalized understanding. Therapeutic exercises performed; HEP handout provided. Pt overall with minimal deficits to functional mobility; PT provided supervision for transfers, ambulation, and stair negotiation. PT recommends DC home with HHPT and assist as needed.     End of Session Patient Position: Up in chair with BLE elevated and ice to surgical site. Call light left in reach; patient instructed not to get up on own and verbalized understanding of this. RN aware.    IP OR SWING BED PT PLAN  Inpatient or Swing Bed: Inpatient  PT Plan  Treatment/Interventions: Bed mobility, Transfer training, Gait training, Stair training, Strengthening, Range of motion, Therapeutic exercise, Therapeutic activity, Home exercise program, Positioning  PT Plan: Skilled PT  PT Frequency: BID  PT Discharge Recommendations: Low intensity level of continued care  Equipment Recommended upon Discharge: Wheeled walker  PT Recommended Transfer Status: Independent, Assistive device  PT - OK  to Discharge: Yes      Subjective     General Visit Information:  General  Reason for Referral: L TKA (4/22/2024)  Referred By: Dr. Vaughn  Past Medical History Relevant to Rehab: OA, ETOH; foot sx, knee scope  Family/Caregiver Present: Yes (wife Lois)  Prior to Session Communication: Bedside nurse  Patient Position Received: On cart  General Comment: Session cleared by RN. Pt very pleasant and agreeable to PT evaluation. Dressing to L knee dry & intact. Hemovac drain to L knee intact draining to gravity.    Home Living:  Home Living  Type of Home: House  Lives With: Spouse  Home Adaptive Equipment: Walker rolling or standard, Cane, Crutches  Home Layout: One level  Home Access: Stairs to enter with rails  Entrance Stairs-Rails: Right  Entrance Stairs-Number of Steps: 2  Bathroom Shower/Tub: Walk-in shower  Bathroom Equipment: Grab bars in shower, Built-in shower seat  Prior Level of Function:  Prior Function Per Pt/Caregiver Report  Level of [place]: Independent with ADLs and functional transfers, Independent with homemaking with ambulation  ADL Assistance: Independent  Homemaking Assistance: Independent  Ambulatory Assistance: Independent  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)  Prior Function Comments: Pt denies falls prior to admission  Precautions:  Precautions  LE Weight Bearing Status: Weight Bearing as Tolerated  Medical Precautions: Fall precautions  Post-Surgical Precautions: Left total knee precautions    Objective   Pain:  Pain Assessment  Pain Assessment: 0-10  Pain Score: 6  Pain Type: Surgical pain  Pain Location: Knee  Pain Orientation: Left  Pain Interventions: Medication (See MAR), Cold applied, Ambulation/increased activity, Elevated (RN admin pain meds during session)  Cognition:  Cognition  Overall Cognitive Status: Within Functional Limits  Attention: Within Functional Limits  Memory: Within Funtional Limits  Problem Solving: Within Functional Limits  Safety/Judgement:  Within Functional Limits  Insight: Within function limits  Impulsive: Within functional limits    General Assessments:  Activity Tolerance  Endurance: Endurance does not limit participation in activity    Sensation  Light Touch: Partial deficits in the RLE, Partial deficits in the LLE (pt reports glutes and posterior LLE numb; distal BLE WFL)  Proprioception: No apparent deficits    Coordination  Movements are Fluid and Coordinated: Yes    Postural Control  Postural Control: Within Functional Limits    Static Sitting Balance  Static Sitting-Balance Support: Feet supported, Bilateral upper extremity supported  Static Sitting-Level of Assistance: Independent    Static Standing Balance  Static Standing-Balance Support: Bilateral upper extremity supported  Static Standing-Level of Assistance: Distant supervision  Static Standing-Comment/Number of Minutes: with rolling walker  Dynamic Standing Balance  Dynamic Standing-Balance Support: Bilateral upper extremity supported  Dynamic Standing-Comments: distant supervision with rolling walker + gait belt  Functional Assessments:  ADL  ADL's Addressed: Yes  Functional Assistance: Stand by  Functional Deficit: Toilet transfer, Supervision/safety, Verbal cueing (cues for sequencing; pt completed with LUE support on grab bar)    Bed Mobility  Bed Mobility: Yes  Bed Mobility 1  Bed Mobility 1: Supine to sitting  Level of Assistance 1: Independent    Transfers  Transfer: Yes  Transfer 1  Transfer From 1: Bed to, Stand to  Transfer to 1: Stand, Toilet, Chair with arms  Technique 1: Sit to stand, Stand to sit  Transfer Device 1: Walker, Gait belt  Transfer Level of Assistance 1: Distant supervision  Trials/Comments 1: x5; pt completed with BUE support on transfer surface    Ambulation/Gait Training  Ambulation/Gait Training Performed: Yes  Ambulation/Gait Training 1  Surface 1: Level tile  Device 1: Rolling walker  Gait Support Devices: Gait belt  Assistance 1: Distant  "supervision  Quality of Gait 1: Decreased step length, Antalgic, Soft knee(s) (soft knee without buckle or LOB; decreased courtney; step to pattern)  Comments/Distance (ft) 1: 25 feet x2    Stairs  Stairs: Yes  Stairs  Rails 1: Right  Device 1: Railing  Support Devices 1: Gait belt  Assistance 1: Close supervision, Minimal verbal cues (cues for sequencing and to engage L quad)  Comment/Number of Steps 1: three 6\" steps completed via step to pattern  Extremity/Trunk Assessments:  RUE   RUE : Within Functional Limits  LUE   LUE: Within Functional Limits  RLE   RLE : Within Functional Limits  LLE   LLE : Exceptions to WFL  AROM LLE (degrees)  LLE AROM Comment: L knee flex <90 degrees; lacking full knee extension in supine and standing  PROM LLE (degrees)  LLE PROM Comment: L knee flex to 90 degrees; full knee extension in supine  Strength LLE  LLE Overall Strength: Greater than or equal to 3/5 as evidenced by functional mobility  Treatments:  Therapeutic Exercise  Therapeutic Exercise Performed: Yes B ankle pumps, L quad sets, L gluteal sets, L heel slides, L SAQ, L hip abduction, and L SLR x 10 reps each.      Therapeutic Activity  Therapeutic Activity Performed: Yes  Therapeutic Activity 1: PT provided pt education on icing/elevating surgical limb, compression stocking use, body mechanics during functional mobility, assistive device use, ther ex; pt verbalized understanding.  Outcome Measures:  [facility] Basic Mobility  Turning from your back to your side while in a flat bed without using bedrails: None  Moving from lying on your back to sitting on the side of a flat bed without using bedrails: None  Moving to and from bed to chair (including a wheelchair): None  Standing up from a chair using your arms (e.g. wheelchair or bedside chair): None  To walk in hospital room: None  Climbing 3-5 steps with railing: None  Basic Mobility - Total Score: 24    Encounter Problems       Encounter Problems (Active)       Mobility       " LTG - Patient will ambulate household distance with rolling walker at distant supervision (Progressing)       Start:  04/22/24            LTG - Patient will navigate 2 steps with R rail at close supervision (Met)       Start:  04/22/24    Resolved:  04/22/24            Pain             Encounter Problems (Resolved)       Balance       LTG - Patient will maintain standing and sitting balance to allow for completion of daily activities (Met)       Start:  04/22/24    Resolved:  04/22/24            Compromised Skin Integrity       LTG - Patient will be free from infection (Met)       Start:  04/22/24    Resolved:  04/22/24            PT Transfers       LTG - Patient will demonstrate safe transfer techniques with rolling walker at distant supervision (Met)       Start:  04/22/24    Resolved:  04/22/24            Safety       LTG - Patient will demonstrate safety requirements appropriate to situation/environment (Met)       Start:  04/22/24    Resolved:  04/22/24                Education Documentation  Handouts, taught by Roxanna Bonner PT at 4/22/2024  2:05 PM.  Learner: Significant Other, Patient  Readiness: Eager  Method: Explanation, Demonstration, Handout  Response: Verbalizes Understanding, Demonstrated Understanding    Precautions, taught by Roxanna Bonner PT at 4/22/2024  2:05 PM.  Learner: Significant Other, Patient  Readiness: Eager  Method: Explanation, Demonstration, Handout  Response: Verbalizes Understanding, Demonstrated Understanding    Body Mechanics, taught by Roxanna Bonner PT at 4/22/2024  2:05 PM.  Learner: Significant Other, Patient  Readiness: Eager  Method: Explanation, Demonstration, Handout  Response: Verbalizes Understanding, Demonstrated Understanding    Home Exercise Program, taught by Roxanna Bonner PT at 4/22/2024  2:05 PM.  Learner: Significant Other, Patient  Readiness: Eager  Method: Explanation, Demonstration, Handout  Response: Verbalizes Understanding, Demonstrated  Understanding    Mobility Training, taught by Roxanna Bonner PT at 4/22/2024  2:05 PM.  Learner: Significant Other, Patient  Readiness: Eager  Method: Explanation, Demonstration, Handout  Response: Verbalizes Understanding, Demonstrated Understanding          Roxanna Bonner PT, DPT

## 2024-04-22 NOTE — HH CARE COORDINATION
Home Care received a Referral for Physical Therapy. We have processed the referral for a Start of Care on 4/23/2024.     If you have any questions or concerns, please feel free to contact us at [phone]. Follow the prompts, enter your five digit zip code, and you will be directed to your care team on [facility] 2.

## 2024-04-22 NOTE — DISCHARGE INSTRUCTIONS
MD Luna Garcia MPAS, JENNIFER, ATC  Adult Reconstruction and Joint Replacement Surgery  Phone: [phone]     Fax: [phone]        DISCHARGE INSTRUCTIONS      PLEASE READ CAREFULLY BEFORE CONTACTING YOUR PROVIDER.    WE WORK COLLABORATIVELY AS A TEAM. CALLING MULTIPLE STAFF MEMBERS REGARDING THE SAME ISSUE WILL DELAY YOUR CARE.    [employer] IS THE PREFERRED COMMUNICATION FOR ALL TEAM MEMBERS.    POSTOPERATIVE INSTRUCTIONS: TOTAL HIP & TOTAL KNEE ARTHROPLASTY    JOINT CARE TEAM  Please use the information below to contact your care team following surgery.  If you are leaving a message or using the [employer] Chart portal, please include your full name, date of birth and date of surgery so that we can correctly identify you.  Your call will be returned within 1-2 business days, please do not leave multiple messages with other staff regarding a single issue while you are awaiting a return call.     Who to call Contact Information Matters needing handled   Luna Oliver PA-C  Physician Assistant [facility] [employer] [facility]   Medical questions/concerns       Rachel Bruner-    [email or web address]           [phone]  opt. 2    [phone] fax  [phone]         Scheduling office Visits  Medical questions/concerns  Leave of Absence or other paperwork  Any concerns more than 6 weeks from surgery - an appointment will need to be made   Sarita Thao MBA, BSN, RN-BC  Ortho Nurse Navigator [place]    Geraldine Romero RN, BSN  Ortho Nurse Navigator [place]        __________________________________    Sb Mcconnell RN, BSN  Ortho Coordinator [facility] LEONARDON-BC  Ortho Nurse Navigator [facility]       [phone] Please call staff at the institution in which you had surgery for prescription refills        Prescription Refills  Nursing, medical question related questions or concerns within 6 weeks of surgery   Orders  for Outpatient Physical Therapy             MEDICATION REFILLS - MyChart or Nurse Navigator (Above) at the institution in which you had surgery. [facility] or [facility].    -You will NOT receive a call indicating that your prescription has been filled.  Please contact your pharmacy with any questions.    Medication refills will be filled Monday-Friday 7am to 1pm ONLY. Please call the nurse navigator office or send a [employer] message for a refill request.  Any requests received outside of this timeframe will be handled on the next business day.  Please do not call multiple times or call other members of the care team for medication needs, this will cause the refill to take longer.    Per State and Institutional policy, pain medications can only be refilled every 7 days for up to six weeks following surgery.    My Chart Portal: If you are using the My Chart portal and are requesting a medication refill, please list what type of surgery you had and left or right side, medication that needs refilled, and pharmacy you would like your medication sent.     WEIGHT BEARING- weight bearing as tolerated to operative extremity     ACTIVITY-As Tolerated    DRIVING & TRAVEL AFTER SURGERY   Patients should anticipate waiting at least 4-6 weeks before traveling long distances after surgery.  You will need to stop to walk around ever 1 hour during your travel to help with blood clot prevention.    Patients may not drive until cleared by the joint nurse or the office and you are off of all narcotics.    DENTAL PROCEDURES & CLEANINGS  You must wait a minimum of 3 months for elective dental appointments after a total joint replacement, including routine cleanings or dental work including bridges, crowns, extractions, etc.. Unless, it is an emergency. You will need a prophylactic antibiotic lifelong prior to any dental visit, cleaning or procedure. Your surgeon's office or your dentist may provide a prescription antibiotic. Antibiotics are  a lifelong need before dental appointments.      You do not need antibiotics for endoscopic procedures such as colonoscopy or EGD, dermatologic biopsies or eye surgeries.    WOUND CARE  If you experience continued drainage or bleeding, you may cover with abdominal/Maxi pads (purchase at local drug store).  Knee replacements should wrap with an ace wrap.  You may shower with waterproof dressing on. Your surgical bandage will be removed by your home therapist 1 week after surgery. If you have staples intact, home care will remove in 2 weeks. If you have sutures intact, you will need to return to the office in 2 weeks for suture removal. Once the dressing is removed by home care, you may continue to shower. Let soap and water wash over the wound. DO NOT SCRUB.  Steri-strips under the bandage will remain in place until they fall off on their own.  If they are loose, you may gently remove.  If they have not fallen off in two weeks, gently peel them off. Do not remove if pulling causes resistance against the incision.  You will see suture tails sticking out of the ends of the incision.  DO NOT CUT THEM.  They will fall off when the sutures dissolve.  If they are bothersome, cover with a band aid.  Do not soak in a bath tub, hot tub, pool or lake until you are 8 weeks out from surgery.  Do not apply lotions, creams or ointments until you are 6 weeks out from surgery,    PAIN, SWELLING, BRUISING & CLICKING  Pain and swelling are a natural part of your recovery which is considered normal for up to a year after surgery.  Symptoms may be treated with movement, ice, compression stockings, elevating your leg, and by following the pain medication regimen as prescribed.  Bruising is normal for several weeks after surgery. You may also have leg swelling and pain in your shin.  You may ice areas that are tender to help with discomfort.  You are required to wear the provided compression stockings, every day, for 4 weeks following  surgery.  Remove the stockings at night and place them back on in the morning.  Pain and swelling may temporarily increase with an increase in activity or exercise.  Use ice after activity.  Audible clicking with movement or exercises is considered normal following joint replacement.  If this persists at 6 months or 1 year, please notify your surgeon.  You may also feel decreased sensation or numbness near the incision site.  This is normal and sensation may or may not return.    PERSONAL HYGIENE  You may shower upon discharging from the hospital.  Soap and water is permitted to run over the surgical dressing, steri-strips and incision.  Do not scrub directly over these items.  DO NOT soak your incision in a bath, hot tub, pool or pond/lake for a minimum of 8 weeks following your surgery.  DO NOT use lotions, creams, ointments on your wound for a minimum of 6 weeks following your surgery. At that time you may use vitamin E to assist with softening of your incision.      RESTARTING HOME ROUTINE - DIET & MEDICATIONS  Post-operative constipation can result due to a combination of inactivity, anesthesia and pain medication. To help prevent this, you should increase your water and fiber intake. Physical activity such as walking will also help stimulate the bowels.   You may resume your normal diet when you discharge home.    To avoid constipation, choose foods that help promote good bowel habits, such as foods high in fiber.  You may restart your home medications the following day after your surgery UNLESS you have been given alternate instructions.  Follow the instructions given to you on your hospital discharge instructions for more information regarding your home medications.  IF YOU EXPERIENCE NAUSEA OR DIARRHEA, FOLLOW THE B.R.A.T. DIET UNTIL SYMPTOMS RESOLVE.  If you are experiencing vomiting that lasts more than 24 hours, please contact your joint nurse.      IN-HOME PHYSICAL THERAPY & OUTPATIENT PHYSICAL  THERAPY  In-home physical therapy will start 1-2 days after you get home from the hospital.    The home care agency will call within the first 24-48 hours to set up their first visit.  Please do not call your care team to inquire during this timeframe.  Continue the exercises you were given in the hospital until you have been seen by in-home therapy.  Make sure to provide a phone number with the ability for the home care staff to leave a message if you do not answer your phone.    Outpatient physical therapy following knee replacement surgery should begin 2-3 weeks after surgery.  You will be given physical therapy order prior to discharge from the hospital. You should call to schedule this appointment ASAP if not already scheduled before surgery.  Waiting until you are ready for outpatient physical therapy will cause a delay in your care.  You may choose any outpatient physical therapy location.      EMERGENCIES - WHEN TO CONTACT THE SURGEON'S OFFICE IMMEDIATELY  Fever >101 with chills that has been present for at least 48 hours.   Excessive bleeding from incision that will not slow down. A small amount of drainage is normal and expected.  Once pressure is applied and the area is covered, do not continue to check the area regularly.  This will remove pressure and bleeding will continue.  Leave in place for 4-6 hours.  Signs of infection of incision-excessive drainage that is soaking through your dressing (especially if it is pus-like), redness that is spreading out from the edges of your incision, or increased warmth around the area.  Excruciating pain for which the pain medication, taken as instructed, is not helping.  Severe calf pain.  Go directly to the emergency room or call 911, if you are experiencing chest pain or difficulty breathing.    ICE & COLD THERAPY INSTRUCTIONS    To assist with pain control and post-op swelling, you should be using ice regularly throughout recovery, especially for the first 6  weeks, regardless of the cold therapy method you use.      Always make sure there is a layer of protection between the cold pad and your skin.    If you are using ICE PACKS or GEL PACKS, you will need to alternate 20 minutes on, 20 minutes off twice per hour.    If you are using an ICE MACHINE, please follow the provided ice machine instructions.  These devices differ from ice or ice packs whereas the mechanism circulates water through tubing and a pad to provide longer periods of cold therapy to the desired site.  You can use your cold devices around the clock for optimal comfort.  We recommend using cold therapy after working with therapy or completing exercises on your own.  There is no set schedule in which you must follow while using cold therapy.  Below are a few points to remember when using a cold therapy device:    You do not need to need to use the 20 on, 20 off method.  Detach the pad from the cooler and ambulate at least once every hour.  You can check your skin under the pad at this time.  You may wear the cold therapy device during periods of sleep including overnight.  If you wake up during the night, you can check the skin at this time.  You do not need to wake up specifically to perform skin checks.  Empty the cooler and pad when device is not in use.  Follow 's instructions for cleaning your cold therapy device.    DISCHARGE MEDICATIONS - Please reference the sample schedule on the reverse side for instructions on how to best schedule medications.    PAIN MEDICATION    ___X_ Tramadol / Oxycodone  Tramadol and Oxycodone have been prescribed for post-operative pain control.    These medications will only be refilled ONCE every 7 days for a period of up to 6 weeks following surgery.  After 6 weeks, you will transition to acetaminophen and over -the- counter anti-inflammatories such as Ibuprofen, Advil or Aleve in conjunction with ICE/COLD THERAPY.   Side effects may be constipation and  nausea, vomiting, sleepiness, dizziness, lightheadedness, headache, blurred vision, dry mouth sweating, itching (if you have itching, over-the -counter Benadryl can be used as needed).  You may NOT operate a motor vehicle while taking these medications or have been cleared by your care team.     ___X_ Acetaminophen (Tylenol)  Acetaminophen has been prescribed as an adjunct for pain control. Take two 500 mg tablets every 6 hours for 4 weeks. You will not receive a refill on this medication.  Do not exceed 4000mg of acetaminophen within a 24 hour period.  Side effects may include nausea, heartburn, drowsiness, and headache.    ___X_ Meloxicam (Mobic)-Meloxicam has been prescribed as an adjunct anti-inflammatory to assist in pain control.    Take one 15mg tablet once daily for 4 weeks.  You will not receive refills on this medication.   Side effects may include nausea.  May not be prescribed if you are on a more potent blood thinner than aspirin or have chronic kidney disease.    BLOOD THINNER    ___X_ Blood Thinner   A blood thinner has been prescribed to prevent blood clots in your leg or lungs. Take as prescribed on the bottle for 4 weeks. You will not receive a refill on this medication.    ANTI NAUSEA    ___X_ Proton Pump Inhibitor (PPI)-Stomach Acid Reduction Medication  If you are already on a PPI, you will continue your regular medication. If you are not, you will be prescribed Pantoprazole to help with nausea and protect your stomach while taking pain medication.  You will not receive a refill on this medication.    STOOL SOFTENERS    ___X_ Colace (Docusate Sodium) & Miralax (polyethylene glycol)  Take both medications to help with constipation while using the Oxycodone and Tramadol for pain control.  You will not receive a refill on this medication.    Continued Constipation  If you continue to be constipated despite daily use of Miralax and Colace, you try an over-the-counter Dulcolax Suppository and use per  instructions on the package.       SPECIAL INSTRUCTIONS   ***    You will not receive refills on the following medications.   Acetaminophen (Tylenol  Meloxicam  Miralax  Colace  Proton Pump Inhibitor (PPI)  Blood Thinner    Pain Medication Refills - Ortho Nurse Navigator or Abhishek- Monday through Friday 7am-1pm    FOLLOW-UP- You should have an appointment with either Dr. Vaughn or CULLEN aSrmiento in 6 weeks.         SAMPLE              The times below are an example of how to organize medications to optimize pain control  Your actual medication schedule may vary based on your last dose taken IN THE HOSPITAL      Time 3:00 am 6:00 am 9:00 am 12:00 pm 3:00 pm 6:00 pm 9:00 pm 12:00 am   Medications Tramadol Tylenol  Oxycodone  Miralax   Blood Thinner  Colace  Pantoprazole or other PPI  Tramadol  Meloxicam Tylenol  Oxycodone Tramadol Tylenol  Oxycodone  Miralax Blood Thinner  Colace  Tramadol   Tylenol  Oxycodone            You may begin to wean off the pain medication as your pain remains controlled with increased activity.  The schedules provided are meant to serve as an example.  You may wean off based on your pain control.  Please note that pain medications are not filled beyond 6 weeks after surgery.              The times below are an example of how to WEAN OFF medications WHILE CONTINUING TO OPTIMIZE PAIN CONTROL.  Your actual medication schedule may vary based on your last dose taken.    Time 12:00am 4:00am 8:00am 12:00pm 4:00pm 8:00pm   Med Tramadol Oxycodone   Tramadol Oxycodone Tramadol Oxycodone     Time 12:00am 6:00am 12:00pm 6:00pm   Med Tramadol Oxycodone   Tramadol Oxycodone     Time 12:00am 8:00am 4:00pm   Med Tramadol Oxycodone   Tramadol     Time 12:00am 12:00pm   Med Tramadol Tramadol         TOTAL KNEE REPLACEMENT PATIENTS SHOULD TRANSITION TO OUTPATIENT PHYSICAL THERAPY NO MORE THAN 3 WEEKS FOLLOWING SURGERY.  PLEASE SEE THE LIST OF  FACILITIES BELOW.  CALL TO SCHEDULE YOUR FIRST  APPOINTMENT BEFORE YOU HAVE YOUR SURGERY.

## 2024-04-22 NOTE — PROGRESS NOTES
Medication Education     Medication education for Amarjit Hernandez was provided to the patient and family for the following medication(s):  Aspirin  Docusate  Meloxicam  Oxycodone  Tylenol  Pantoprazole  Miralax  Tramadol    Medication education provided by a Pharmacist:  -Proper dose, indication, possible ADRs   -How the medication works and benefits of taking it  -Importance of compliance   -Potential duration of therapy    Identified potential barriers to education:  None    Method(s) of Education:  Verbal Written materials provided and reviewed    An opportunity to ask questions and receive answers was provided.     Assessment of understanding the patient and family:  2= meets goals/outcomes    Additional Notes (if applicable): Meds to beds given to patient and family.    Adrianna Stewart, AmandaD

## 2024-04-22 NOTE — ANESTHESIA PROCEDURE NOTES
Peripheral Block    Patient location during procedure: pre-op  Start time: 4/22/2024 6:58 AM  End time: 4/22/2024 6:59 AM  Reason for block: at surgeon's request and post-op pain management  Staffing  Performed: attending   Authorized by: Sophia San MD    Performed by: Sophia San MD  Preanesthetic Checklist  Completed: patient identified, IV checked, site marked, risks and benefits discussed, surgical consent, monitors and equipment checked, pre-op evaluation and timeout performed   Timeout performed at: 4/22/2024 6:55 AM  Peripheral Block  Patient position: laying flat  Prep: ChloraPrep  Patient monitoring: heart rate, continuous pulse ox and cardiac monitor  Block type: adductor canal  Laterality: left  Injection technique: single-shot  Guidance: ultrasound guided  Local infiltration: ropivacaine  Infiltration strength: 0.5 %  Dose: 20 mL  Needle  Needle gauge: 21 G  Needle length: 10 cm  Needle localization: ultrasound guidance     image stored in chart  Assessment  Injection assessment: incremental injection, negative aspiration for heme, local visualized surrounding nerve on ultrasound and no paresthesia on injection  Paresthesia pain: none  Heart rate change: no  Slow fractionated injection: yes

## 2024-04-25 NOTE — SIGNIFICANT EVENT
Thank you for taking my call today regarding your recent joint replacement surgery with Dr. Eran Vaughn.      We discussed that: Home Health Care services (physical and/or occupational therapy) have been initiated Your pain is Controlled on the current regimen Will fluctuate throughout recovery with increased activity You are able to tolerate regular activity and exercises The importance of continued cold therapy throughout recovery The importance of following the prescribed precautions by your surgeon You have had a bowel movement The importance of continuing blood thinner as prescribed The importance of wearing compression stockings as prescribed    You indicated that all of your questions have been answered at the time of our call.    Please don't hesitate to reach out if you have any additional questions or concerns.    Sarita Thao MBA, BSN, RN-BC  KEN Serrato, RN  Orthopedic Program Navigators  [facility] [phone]

## 2024-06-06 ENCOUNTER — HOSPITAL ENCOUNTER (OUTPATIENT)
Dept: RADIOLOGY | Facility: CLINIC | Age: 54
Discharge: HOME | End: 2024-06-06
Payer: COMMERCIAL

## 2024-06-06 ENCOUNTER — APPOINTMENT (OUTPATIENT)
Dept: ORTHOPEDIC SURGERY | Facility: CLINIC | Age: 54
End: 2024-06-06
Payer: COMMERCIAL

## 2024-06-06 ENCOUNTER — OFFICE VISIT (OUTPATIENT)
Dept: ORTHOPEDIC SURGERY | Facility: CLINIC | Age: 54
End: 2024-06-06
Payer: COMMERCIAL

## 2024-06-06 VITALS — WEIGHT: 193 LBS | HEIGHT: 72 IN | BODY MASS INDEX: 26.14 KG/M2

## 2024-06-06 DIAGNOSIS — Z96.652 AFTERCARE FOLLOWING LEFT KNEE JOINT REPLACEMENT SURGERY: ICD-10-CM

## 2024-06-06 DIAGNOSIS — Z47.1 AFTERCARE FOLLOWING LEFT KNEE JOINT REPLACEMENT SURGERY: ICD-10-CM

## 2024-06-06 PROCEDURE — 99024 POSTOP FOLLOW-UP VISIT: CPT | Performed by: ORTHOPAEDIC SURGERY

## 2024-06-06 PROCEDURE — 73562 X-RAY EXAM OF KNEE 3: CPT | Mod: LT

## 2024-06-06 PROCEDURE — 73562 X-RAY EXAM OF KNEE 3: CPT | Mod: LEFT SIDE | Performed by: STUDENT IN AN ORGANIZED HEALTH CARE EDUCATION/TRAINING PROGRAM

## 2024-06-06 PROCEDURE — 1036F TOBACCO NON-USER: CPT | Performed by: ORTHOPAEDIC SURGERY

## 2024-06-06 RX ORDER — AMOXICILLIN 500 MG/1
CAPSULE ORAL
Qty: 4 CAPSULE | Refills: 6 | Status: SHIPPED | OUTPATIENT
Start: 2024-06-06

## 2024-06-06 ASSESSMENT — PAIN - FUNCTIONAL ASSESSMENT: PAIN_FUNCTIONAL_ASSESSMENT: NO/DENIES PAIN

## 2024-06-06 NOTE — PROGRESS NOTES
Patient is a 54-year-old gentleman who presents today for 6-week follow-up status post left total knee arthroplasty, overall is doing well, he is finishing outpatient therapy, reports no complications    Pain free range of motion of left knee from 0-110, stable to varus/valgus/ant/post stress, alignment is neutral, incision is well healed with no signs of surrounding infection. Neurovascular exam is at baseline.      X-rays reviewed by myself today in clinic reveal excellent alignment of the total knee arthroplasty components with no signs of early loosening or failure.    S/P left total knee arthroplasty doing well, F/U in 1 year, continue activity as tolerated, new x-rays at next visit    This note was created using voice recognition software and was not corrected for typographical or grammatical errors.

## 2025-06-05 ENCOUNTER — APPOINTMENT (OUTPATIENT)
Dept: ORTHOPEDIC SURGERY | Facility: CLINIC | Age: 55
End: 2025-06-05
Payer: COMMERCIAL

## 2025-07-17 ENCOUNTER — OFFICE VISIT (OUTPATIENT)
Dept: ORTHOPEDIC SURGERY | Facility: CLINIC | Age: 55
End: 2025-07-17
Payer: COMMERCIAL

## 2025-07-17 ENCOUNTER — HOSPITAL ENCOUNTER (OUTPATIENT)
Dept: RADIOLOGY | Facility: CLINIC | Age: 55
Discharge: HOME | End: 2025-07-17
Payer: COMMERCIAL

## 2025-07-17 DIAGNOSIS — Z96.652 AFTERCARE FOLLOWING LEFT KNEE JOINT REPLACEMENT SURGERY: ICD-10-CM

## 2025-07-17 DIAGNOSIS — Z47.1 AFTERCARE FOLLOWING LEFT KNEE JOINT REPLACEMENT SURGERY: ICD-10-CM

## 2025-07-17 PROCEDURE — 73562 X-RAY EXAM OF KNEE 3: CPT | Mod: LT

## 2025-07-17 PROCEDURE — 99212 OFFICE O/P EST SF 10 MIN: CPT | Performed by: ORTHOPAEDIC SURGERY

## 2025-07-17 PROCEDURE — 1036F TOBACCO NON-USER: CPT | Performed by: ORTHOPAEDIC SURGERY

## 2025-07-17 PROCEDURE — 73562 X-RAY EXAM OF KNEE 3: CPT | Mod: LEFT SIDE | Performed by: RADIOLOGY

## 2025-07-17 PROCEDURE — 99213 OFFICE O/P EST LOW 20 MIN: CPT | Performed by: ORTHOPAEDIC SURGERY

## 2025-07-17 ASSESSMENT — PAIN - FUNCTIONAL ASSESSMENT: PAIN_FUNCTIONAL_ASSESSMENT: 0-10

## 2025-07-17 ASSESSMENT — PAIN SCALES - GENERAL: PAINLEVEL_OUTOF10: 0 - NO PAIN

## 2025-07-17 NOTE — PROGRESS NOTES
Patient returns a little over a year status post left total knee arthroplasty.  Overall is doing well.  Reports no complications.  He is happy with the results the operation.    Left knee:  AAOx3, NAD, walks with a non-antalgic gait  Neutral allignment  Range of motion 0-110  Stable to varus/valgus/anterior/posterior stress through out the range of motion  No no joint line tenderness to palpation  No effusion  SILT in a antonietta/saph/per/tib distribution  5/5 knee extension/df/pf/ehl  ½ dorsalis pedis and posterior tibial pulse  no popliteal lymphadenopathy  no other overlying lesions  mood: euthymic  Respirations non labored    X-rays reviewed by myself today in clinic reveal excellent alignment of the total knee arthroplasty components with no signs of early loosening or failure.    S/P left total knee arthroplasty doing well, F/U in 5 years, continue activity as tolerated, new x-rays at next visit    This note was created using voice recognition software and was not corrected for typographical or grammatical errors.

## (undated) DEVICE — SYRINGE, 50 CC, LUER LOCK

## (undated) DEVICE — GLOVE, SURGICAL, PROTEXIS PI BLUE W/NEUTHERA, 7.0, PF, LF

## (undated) DEVICE — BANDAGE, COFLEX, 6 X 5 YDS, TAN, STERILE, LF

## (undated) DEVICE — DRESSING, MEPILEX, BORDER LIGHT, 3 X 3 IN

## (undated) DEVICE — SUTURE, ETHIBOND, P2, V-37, 30 IN, GREEN

## (undated) DEVICE — ADULT REM POLYHESIVE II PATIENT RETURN ELECTRODE W/9 FT (2.7 M) ATTACHED CORD

## (undated) DEVICE — CEMENT, MIXEVAC III, 10S BOWL, KNEES

## (undated) DEVICE — HOOD, SURGICAL, FLYTE HYBRID

## (undated) DEVICE — CUFF, TOURNIQUET, 30 X 4, DUAL PORT/SNGL BLADDER, DISP, LF

## (undated) DEVICE — Device

## (undated) DEVICE — DRAPE, IRRIGATION, W/POUCH, ADHESIVE STRIP, STERI DRAPE, 19 X 23 IN, DISPOSABLE, STERILE

## (undated) DEVICE — GLOVE, SURGICAL, PROTEXIS PI , 6.5, PF, LF

## (undated) DEVICE — GLOVE, SURGICAL, PROTEXIS PI , 7.5, PF, LF

## (undated) DEVICE — BLADE, SAW, DUAL SAGITTAL, 1.9 X 90 X 30

## (undated) DEVICE — NEEDLE, SPINAL, QUINCKE, 18 G X 3.5 IN, PINK HUB

## (undated) DEVICE — DRAPE, ISOLATION, INCISE, W/POUCH, STERI DRAPE, 125 X 83 IN, DISPOSABLE, STERILE

## (undated) DEVICE — 10IN STRYKER SMOKE EVACUATION TUBING

## (undated) DEVICE — SYRINGE, 60 CC, IRRIGATION, BULB, CONTRO-BULB, PAPER POUCH

## (undated) DEVICE — 3/16IN OD X 49IN, MEDIUM-LARGE SINGLE ROUND SILICONE DRAIN W/TROCAR, 10IN HOLE PATTERN

## (undated) DEVICE — GLOVE, SURGICAL, PROTEXIS PI , 8.0, PF, LF

## (undated) DEVICE — SOLUTION, INJECTION, 0.9% SODIUM CHL, USP LIFECARE 1000 MI

## (undated) DEVICE — SUTURE, MONOCRYL, 3-0, 27 IN, PS-2, UNDYED

## (undated) DEVICE — CHANNEL DRAIN, BARD, 15FR, ROUND HUBLESS, FULL FLUTED

## (undated) DEVICE — STRIP, SKIN CLOSURE, STERI STRIP, REINFORCED, 0.5 X 4 IN

## (undated) DEVICE — DRAPE, SHEET, U, W/ADHESIVE STRIP, IMPERVIOUS, 60 X 70 IN, DISPOSABLE, LF, STERILE

## (undated) DEVICE — SUTURE, VICRYL, 0, 18 IN, CT-1, UNDYED

## (undated) DEVICE — SUTURE, VICRYL, 2-0, 18 IN CP-2, UNDYED

## (undated) DEVICE — BLANKET, LOWER BODY, VHA PLUS, ADULT

## (undated) DEVICE — DRAIN, WOUND, ROUND, W/TROCAR, HOLE PATTERN, 10 IN, MEDIUM/LARGE, 3/16 X 49 IN